# Patient Record
Sex: MALE | Race: WHITE | NOT HISPANIC OR LATINO | Employment: OTHER | URBAN - METROPOLITAN AREA
[De-identification: names, ages, dates, MRNs, and addresses within clinical notes are randomized per-mention and may not be internally consistent; named-entity substitution may affect disease eponyms.]

---

## 2021-05-26 ENCOUNTER — RECORDS - HEALTHEAST (OUTPATIENT)
Dept: ADMINISTRATIVE | Facility: CLINIC | Age: 86
End: 2021-05-26

## 2023-10-21 ENCOUNTER — HEALTH MAINTENANCE LETTER (OUTPATIENT)
Age: 88
End: 2023-10-21

## 2023-11-07 ENCOUNTER — TRANSFERRED RECORDS (OUTPATIENT)
Dept: HEALTH INFORMATION MANAGEMENT | Facility: CLINIC | Age: 88
End: 2023-11-07
Payer: COMMERCIAL

## 2023-11-08 ENCOUNTER — TELEPHONE (OUTPATIENT)
Dept: CARDIOLOGY | Facility: CLINIC | Age: 88
End: 2023-11-08
Payer: COMMERCIAL

## 2023-11-08 NOTE — TELEPHONE ENCOUNTER
[facility] Call [facility]    Phone Message    May a detailed message be left on voicemail: yes     Reason for Call: Other: Pt called in wanting to know if he could schedule for a stress test. Please eloy pt back for further discussion at [phone]     Action Taken: Other: cardiology    Travel Screening: Not Applicable    Thank you!  [facility]

## 2023-11-08 NOTE — TELEPHONE ENCOUNTER
Phone call to patient to discuss stress test. Patient had PCP OV 11/7 with  Dr Toscano, and a stress test was dicussed. Per chart review unable to find order or note. Call place to  [facility] and spoke to Jenn. She located order which was originally place to [facility], and will fax to [facility] location per patient request. Phone call to patient to update, and direct contact provided for further questions. Reviewd upcoming OV with Dr Salazar on 12/6. He verbalized understanding, and no further questions at this time.  -sea

## 2023-11-10 ENCOUNTER — HOSPITAL ENCOUNTER (OUTPATIENT)
Dept: CARDIOLOGY | Facility: HOSPITAL | Age: 88
Discharge: HOME | End: 2023-11-10
Attending: FAMILY MEDICINE
Payer: COMMERCIAL

## 2023-11-10 ENCOUNTER — HOSPITAL ENCOUNTER (OUTPATIENT)
Dept: NUCLEAR MEDICINE | Facility: HOSPITAL | Age: 88
Discharge: HOME | End: 2023-11-10
Attending: FAMILY MEDICINE
Payer: COMMERCIAL

## 2023-11-10 DIAGNOSIS — I25.118 ATHEROSCLEROSIS OF CORONARY ARTERY OF NATIVE HEART WITH OTHER FORM OF ANGINA PECTORIS, UNSPECIFIED VESSEL OR LESION TYPE (H): ICD-10-CM

## 2023-11-10 LAB
CV STRESS CURRENT BP HE: NORMAL
CV STRESS CURRENT HR HE: 75
CV STRESS CURRENT HR HE: 76
CV STRESS CURRENT HR HE: 77
CV STRESS CURRENT HR HE: 77
CV STRESS CURRENT HR HE: 78
CV STRESS CURRENT HR HE: 80
CV STRESS CURRENT HR HE: 83
CV STRESS CURRENT HR HE: 85
CV STRESS CURRENT HR HE: 91
CV STRESS CURRENT HR HE: 92
CV STRESS DEVIATION TIME HE: NORMAL
CV STRESS ECHO PERCENT HR HE: NORMAL
CV STRESS EXERCISE STAGE HE: NORMAL
CV STRESS FINAL RESTING BP HE: NORMAL
CV STRESS FINAL RESTING HR HE: 77
CV STRESS MAX HR HE: 92
CV STRESS MAX TREADMILL GRADE HE: 0
CV STRESS MAX TREADMILL SPEED HE: 0
CV STRESS PEAK DIA BP HE: NORMAL
CV STRESS PEAK SYS BP HE: NORMAL
CV STRESS PHASE HE: NORMAL
CV STRESS PROTOCOL HE: NORMAL
CV STRESS RESTING PT POSITION HE: NORMAL
CV STRESS RESTING PT POSITION HE: NORMAL
CV STRESS ST DEVIATION AMOUNT HE: NORMAL
CV STRESS ST DEVIATION ELEVATION HE: NORMAL
CV STRESS ST EVELATION AMOUNT HE: NORMAL
CV STRESS TEST TYPE HE: NORMAL
CV STRESS TOTAL STAGE TIME MIN 1 HE: NORMAL
RATE PRESSURE PRODUCT: NORMAL
STRESS ECHO BASELINE DIASTOLIC HE: 77
STRESS ECHO BASELINE HR: 77
STRESS ECHO BASELINE SYSTOLIC BP: 174
STRESS ECHO CALCULATED PERCENT HR: 69 %
STRESS ECHO LAST STRESS DIASTOLIC BP: 70
STRESS ECHO LAST STRESS HR: 80
STRESS ECHO LAST STRESS SYSTOLIC BP: 127
STRESS ECHO TARGET HR: 134
STRESS/REST PERFUSION RATIO: 1.26

## 2023-11-10 PROCEDURE — 78452 HT MUSCLE IMAGE SPECT MULT: CPT

## 2023-11-10 PROCEDURE — 93017 CV STRESS TEST TRACING ONLY: CPT

## 2023-11-10 PROCEDURE — 93016 CV STRESS TEST SUPVJ ONLY: CPT | Performed by: INTERNAL MEDICINE

## 2023-11-10 PROCEDURE — A9500 TC99M SESTAMIBI: HCPCS

## 2023-11-10 PROCEDURE — 78452 HT MUSCLE IMAGE SPECT MULT: CPT | Mod: 26 | Performed by: INTERNAL MEDICINE

## 2023-11-10 PROCEDURE — 250N000011 HC RX IP 250 OP 636

## 2023-11-10 PROCEDURE — 343N000001 HC RX 343

## 2023-11-10 PROCEDURE — 93018 CV STRESS TEST I&R ONLY: CPT | Performed by: INTERNAL MEDICINE

## 2023-11-10 RX ORDER — AMINOPHYLLINE 25 MG/ML
50 INJECTION, SOLUTION INTRAVENOUS
Status: DISCONTINUED | OUTPATIENT
Start: 2023-11-10 | End: 2023-11-10 | Stop reason: HOSPADM

## 2023-11-10 RX ORDER — REGADENOSON 0.08 MG/ML
0.4 INJECTION, SOLUTION INTRAVENOUS ONCE
Status: COMPLETED | OUTPATIENT
Start: 2023-11-10 | End: 2023-11-10

## 2023-11-10 RX ADMIN — Medication 29.6 MILLICURIE: at 13:05

## 2023-11-10 RX ADMIN — Medication 8.83 MILLICURIE: at 11:52

## 2023-11-10 RX ADMIN — REGADENOSON 0.4 MG: 0.08 INJECTION, SOLUTION INTRAVENOUS at 13:05

## 2023-11-22 ENCOUNTER — OFFICE VISIT (OUTPATIENT)
Dept: CARDIOLOGY | Facility: CLINIC | Age: 88
End: 2023-11-22
Payer: COMMERCIAL

## 2023-11-22 VITALS
HEART RATE: 79 BPM | WEIGHT: 200 LBS | RESPIRATION RATE: 16 BRPM | HEIGHT: 66 IN | DIASTOLIC BLOOD PRESSURE: 70 MMHG | SYSTOLIC BLOOD PRESSURE: 151 MMHG | BODY MASS INDEX: 32.14 KG/M2

## 2023-11-22 DIAGNOSIS — I10 BENIGN ESSENTIAL HYPERTENSION: ICD-10-CM

## 2023-11-22 DIAGNOSIS — E78.5 DYSLIPIDEMIA, GOAL LDL BELOW 70: ICD-10-CM

## 2023-11-22 DIAGNOSIS — N18.4 CHRONIC KIDNEY DISEASE, STAGE IV (SEVERE) (H): ICD-10-CM

## 2023-11-22 DIAGNOSIS — I25.119 CORONARY ARTERY DISEASE INVOLVING NATIVE CORONARY ARTERY OF NATIVE HEART WITH ANGINA PECTORIS (H): Primary | ICD-10-CM

## 2023-11-22 DIAGNOSIS — E11.9 TYPE 2 DIABETES MELLITUS WITHOUT COMPLICATION, WITHOUT LONG-TERM CURRENT USE OF INSULIN (H): ICD-10-CM

## 2023-11-22 PROCEDURE — 99205 OFFICE O/P NEW HI 60 MIN: CPT | Performed by: INTERNAL MEDICINE

## 2023-11-22 RX ORDER — ASPIRIN 81 MG/1
81 TABLET ORAL DAILY
COMMUNITY

## 2023-11-22 RX ORDER — METOPROLOL SUCCINATE 50 MG/1
50 TABLET, EXTENDED RELEASE ORAL DAILY
COMMUNITY
Start: 2023-08-01

## 2023-11-22 RX ORDER — ALLOPURINOL 100 MG/1
100 TABLET ORAL DAILY
COMMUNITY
Start: 2023-08-01

## 2023-11-22 RX ORDER — FLUOCINONIDE 0.5 MG/G
CREAM TOPICAL PRN
COMMUNITY
Start: 2023-08-01

## 2023-11-22 RX ORDER — SIMVASTATIN 40 MG
40 TABLET ORAL AT BEDTIME
COMMUNITY
Start: 2023-07-17

## 2023-11-22 RX ORDER — ISOSORBIDE MONONITRATE 30 MG/1
30 TABLET, EXTENDED RELEASE ORAL DAILY
Qty: 30 TABLET | Refills: 11 | Status: SHIPPED | OUTPATIENT
Start: 2023-11-22 | End: 2024-01-09

## 2023-11-22 RX ORDER — GLIPIZIDE 5 MG/1
2 TABLET, FILM COATED, EXTENDED RELEASE ORAL DAILY
COMMUNITY
Start: 2023-08-01

## 2023-11-22 RX ORDER — PREDNISONE 5 MG/1
5 TABLET ORAL DAILY
COMMUNITY
Start: 2023-08-01

## 2023-11-22 RX ORDER — NITROGLYCERIN 0.4 MG/1
TABLET SUBLINGUAL
Qty: 30 TABLET | Refills: 1 | Status: SHIPPED | OUTPATIENT
Start: 2023-11-22

## 2023-11-22 RX ORDER — FLUTICASONE PROPIONATE 50 MCG
2 SPRAY, SUSPENSION (ML) NASAL AT BEDTIME
COMMUNITY
Start: 2023-08-01

## 2023-11-22 RX ORDER — ISOSORBIDE MONONITRATE 30 MG/1
30 TABLET, EXTENDED RELEASE ORAL DAILY
Qty: 30 TABLET | Refills: 11 | Status: SHIPPED | OUTPATIENT
Start: 2023-11-22 | End: 2023-11-22

## 2023-11-22 RX ORDER — LISINOPRIL/HYDROCHLOROTHIAZIDE 10-12.5 MG
1 TABLET ORAL 2 TIMES DAILY
COMMUNITY
Start: 2023-05-31

## 2023-11-22 NOTE — PROGRESS NOTES
Click to link to [facility] HEART CARE NOTE    Thank you,  , for asking me to see Lino Llanos in consultation at [facility] to evaluate chest pain and abnormal nuclear stress test.      Assessment/Plan:   Coronary artery disease with stable angina: The patient developed exertional chest pain, associated with shortness of breath and fatigue.  His symptoms are typically triggered by exertion, relieved by rest, similar pain to that prior to stent placement.  His nuclear stress test there was reported a small size of inducible myocardial ischemia in distal anterior segment, LVEF was 70%.  We discussed the further evaluation and management.  Since the patient has stage IV CKD, small size of inducible myocardial ischemia, stable angina, treat him medically at this time.  Continue aspirin, metoprolol succinate 50 mg daily, simvastatin.  Add isosorbide mononitrate 30 mg daily, titrate the dosage for relief of angina.  If his symptoms become worse, he will call me back.  If he failed medical treatment, and then consider coronary angiogram with possible PCI.  The patient and his son Felipe agreed with the plan.  Benign essential hypertension: His blood pressure is controlled with metoprolol XL 50 mg daily, lisinopril 25 mg daily, hydrochlorothiazide.  Dyslipidemia: Continue simvastatin 40 mg at bedtime.  LDL was 62.  Stage IV CKD, type 2 diabetes: Follow-up with primary care physician.  No change in treatment today.    Thank you for the opportunity to be involved in the care of Lino Llanos. If you have any questions, please feel free to contact me.  I will see the patient again in 6 months and as needed    Much or all of the text in this note was generated through the use of Dragon Dictate voice-to-text software. Errors in spelling or words which seem out of context are unintentional.   Sound alike errors, in particular, may have escaped editing.       History of  Present Illness:   It is my pleasure to see Lino Llanos at the [facility] for evaluation of chest pain and abnormal nuclear stress test. Lino Llanos is a 88 year old male with a medical history of coronary artery disease status post stent placement 20 years ago, benign essential hypertension, dyslipidemia, stage IV CKD and coronary artery disease.    The patient presents to cardiology clinic for evaluation of chest pain and abnormal nuclear stress test.  He states that he developed intermittent exertional chest pain, shortness of breath over last several months.  He described his chest pain as located upper anterior chest, pressure pain, mild in severity, typically associated with exertion, relieved by rest in few minutes.  No radiation.  He also complains some fatigue.  He has no leg edema.  He states that his blood pressure always a little bit high at physician's office.  At home, his blood pressure has been controlled 130/70 mmHg.    Past Medical History:     Patient Active Problem List   Diagnosis    Coronary artery disease involving native coronary artery of native heart with angina pectoris (H24)    Benign essential hypertension    Dyslipidemia, goal LDL below 70    Type 2 diabetes mellitus without complication, without long-term current use of insulin (H)    Chronic kidney disease, stage IV (severe) (H)       Past Surgical History:   No past surgical history on file.    Family History:   Reviewed: Both parents had CAD.  Mother had CABG at age 83.    Social History:    reports that he has quit smoking. His smoking use included cigarettes and pipe. He has never used smokeless tobacco. He reports that he does not use drugs.    Review of Systems:   12 systems are reviewed negative except for in HPI.    Meds:     Current Outpatient Medications:     allopurinol (ZYLOPRIM) 100 MG tablet, Take 100 mg by mouth daily, Disp: , Rfl:     aspirin 81 MG EC tablet, Take 81 mg by mouth daily,  "Disp: , Rfl:     fluocinonide (LIDEX) 0.05 % external cream, Apply topically as needed (Rash, Irritation), Disp: , Rfl:     fluticasone (FLONASE) 50 MCG/ACT nasal spray, Spray 2 sprays into both nostrils at bedtime, Disp: , Rfl:     glipiZIDE (GLUCOTROL XL) 5 MG 24 hr tablet, Take 2 tablets by mouth daily, Disp: , Rfl:     isosorbide mononitrate (IMDUR) 30 MG 24 hr tablet, Take 1 tablet (30 mg) by mouth daily, Disp: 30 tablet, Rfl: 11    linagliptin (TRADJENTA) 5 MG TABS tablet, Take 5 mg by mouth daily, Disp: , Rfl:     lisinopril-hydrochlorothiazide (ZESTORETIC) 10-12.5 MG tablet, Take 1 tablet by mouth 2 times daily, Disp: , Rfl:     metoprolol succinate ER (TOPROL XL) 50 MG 24 hr tablet, Take 50 mg by mouth daily, Disp: , Rfl:     nitroGLYcerin (NITROSTAT) 0.4 MG sublingual tablet, For chest pain place 1 tablet under the tongue every 5 minutes for 3 doses. If symptoms persist 5 minutes after 1st dose call 911., Disp: 30 tablet, Rfl: 1    predniSONE (DELTASONE) 5 MG tablet, Take 5 mg by mouth daily, Disp: , Rfl:     simvastatin (ZOCOR) 40 MG tablet, Take 40 mg by mouth at bedtime, Disp: , Rfl:      Allergies:   Patient has no known allergies.    Objective:      Physical Exam  90.7 kg (200 lb)  1.676 m (5' 6\")  Body mass index is 32.28 kg/m .  BP (!) 151/70 (BP Location: Right arm, Patient Position: Sitting, Cuff Size: Adult Large)   Pulse 79   Resp 16   Ht 1.676 m (5' 6\")   Wt 90.7 kg (200 lb)   BMI 32.28 kg/m      General Appearance:   Awake, Alert, No acute distress.   HEENT:  Pupil equal, reactive to light. No scleral icterus; the mucous membranes were moist. No oral ulcers or thrush.    Neck: No cervical bruits. No JVD. No thyromegaly. No lymph node enlargement or tenderness.   Chest: The spine was straight. The chest was symmetric.   Lungs:   Respirations unlabored. Lungs are clear to auscultation. No crackles. No wheezing.   Cardiovascular:   RRR, normal first and second heart sounds with no murmurs. " "No rubs or gallops.    Abdomen:  Obese. Soft. No tenderness. Non-distended. Bowels sounds are present   Extremities: Equal posterior tibial pulses. No leg edema.   Skin: No rashes or ulcers. Warm, Dry.   Musculoskeletal: No tenderness. No deformity.   Neurologic: Mood and affect are appropriate. No focal deficits.         EKG:  Personally reivewed  Sinus tachycardia  No ischemic ST-T change    Cardiac Imaging Studies  Nuclear stress test on November 10, 2023:    1.The nuclear stress test is abnormal.    2.Negative pharmacological regadenoson ECG for ischemia.    3.There is a small area of mild ischemia in the distal anterior segment(s) of the left ventricle.  No scar seen.    4.The left ventricular ejection fraction at stress is greater than 70%.    5.Stress to rest cavity ratio is 1.26.    6.The patient is at a low risk of future cardiac ischemic events.    There is no prior study for comparison.    Lab Review   Lab Results   Component Value Date     01/02/2020    CO2 23 01/02/2020    BUN 27 01/02/2020     Lab Results   Component Value Date    WBC 16.5 01/02/2020    HGB 13.1 01/02/2020    HCT 38.2 01/02/2020     01/02/2020     01/02/2020     No results found for: \"CHOL\", \"TRIG\", \"HDL\", \"LDL\", \"LDLDIRECT\"  No results found for: \"LDL\"  No results found for: \"TROPONINI\"  No results found for: \"BNP\"  No results found for: \"TSH\"            "

## 2023-12-30 ENCOUNTER — HEALTH MAINTENANCE LETTER (OUTPATIENT)
Age: 88
End: 2023-12-30

## 2024-01-05 ENCOUNTER — TELEPHONE (OUTPATIENT)
Dept: CARDIOLOGY | Facility: CLINIC | Age: 89
End: 2024-01-05
Payer: COMMERCIAL

## 2024-01-05 DIAGNOSIS — I25.119 CORONARY ARTERY DISEASE INVOLVING NATIVE CORONARY ARTERY OF NATIVE HEART WITH ANGINA PECTORIS (H): ICD-10-CM

## 2024-01-05 NOTE — TELEPHONE ENCOUNTER
[facility] Call Center    Phone Message    May a detailed message be left on voicemail: yes     Reason for Call: Medication Refill Request    Has the patient contacted the pharmacy for the refill? Yes   Name of medication being requested: isosorbide mononitrate (IMDUR) 30 MG 24 hr tablet   Provider who prescribed the medication: Antoinette Salazar MD   Pharmacy: [address]  P: [phone]  F: [phone]  Date medication is needed: asap   NEW PHARMACY  Action Taken: Other: cardio    Travel Screening: Not Applicable    Thank you!  [facility]

## 2024-01-09 RX ORDER — ISOSORBIDE MONONITRATE 30 MG/1
30 TABLET, EXTENDED RELEASE ORAL DAILY
Qty: 90 TABLET | Refills: 3 | Status: SHIPPED | OUTPATIENT
Start: 2024-01-09

## 2024-01-29 ENCOUNTER — OFFICE VISIT (OUTPATIENT)
Dept: CARDIOLOGY | Facility: CLINIC | Age: 89
End: 2024-01-29
Attending: INTERNAL MEDICINE
Payer: COMMERCIAL

## 2024-01-29 VITALS
BODY MASS INDEX: 31.73 KG/M2 | HEART RATE: 60 BPM | SYSTOLIC BLOOD PRESSURE: 110 MMHG | WEIGHT: 196.6 LBS | DIASTOLIC BLOOD PRESSURE: 58 MMHG | RESPIRATION RATE: 20 BRPM

## 2024-01-29 DIAGNOSIS — E11.9 TYPE 2 DIABETES MELLITUS WITHOUT COMPLICATION, WITHOUT LONG-TERM CURRENT USE OF INSULIN (H): ICD-10-CM

## 2024-01-29 DIAGNOSIS — I10 BENIGN ESSENTIAL HYPERTENSION: ICD-10-CM

## 2024-01-29 DIAGNOSIS — E78.5 DYSLIPIDEMIA, GOAL LDL BELOW 70: ICD-10-CM

## 2024-01-29 DIAGNOSIS — I25.119 CORONARY ARTERY DISEASE INVOLVING NATIVE CORONARY ARTERY OF NATIVE HEART WITH ANGINA PECTORIS (H): Primary | ICD-10-CM

## 2024-01-29 PROCEDURE — 99214 OFFICE O/P EST MOD 30 MIN: CPT | Performed by: INTERNAL MEDICINE

## 2024-01-29 NOTE — LETTER
1/29/2024    Tomer Toscano MD  [address]  [facility] [address]    RE: Lino Llanos       Dear Colleague,     I had the pleasure of seeing Lino Llanos in the [facility].      Click to link to [facility] HEART CARE NOTE       Assessment/Plan:   Coronary artery disease with stable angina: The patient's exertion no chest pain has been well-controlled with isosorbide mononitrate 30 mg daily, metoprolol succinate 50 mg daily.  Continue current medical treatment.  No coronary angiogram with with PCI at this time.  Benign essential hypertension: His blood pressure is controlled with metoprolol XL 50 mg daily, lisinopril-hydrochlorothiazide 10-25 mg daily.  Dyslipidemia: Continue simvastatin 40 mg at bedtime.  LDL was 62.  Stage IV CKD, type 2 diabetes: Follow-up with primary care physician.  No change in treatment today.    Thank you for the opportunity to be involved in the care of Lino Llanos. If you have any questions, please feel free to contact me.  I will see the patient again in 6 months and as needed    Much or all of the text in this note was generated through the use of Dragon Dictate voice-to-text software. Errors in spelling or words which seem out of context are unintentional.   Sound alike errors, in particular, may have escaped editing.       History of Present Illness:   It is my pleasure to see Lino Llanos at the [facility] for routine cardiology follow-up. Lino Llanos is a 88 year old male with a medical history of coronary artery disease status post stent placement 20 years ago, benign essential hypertension, dyslipidemia, stage IV CKD and coronary artery disease.    The patient states that his chest pain has been well-controlled with isosorbide mononitrate 30 mg daily after isosorbide mononitrate was started at last visit.  He denies shortness of breath on exertion, palpitations, dizziness,  orthopnea, PND.  He has trace leg edema which has been stable.  His blood pressure and heart rate are controlled.  LDL was controlled.      Past Medical History:     Patient Active Problem List   Diagnosis    Coronary artery disease involving native coronary artery of native heart with angina pectoris (H24)    Benign essential hypertension    Dyslipidemia, goal LDL below 70    Type 2 diabetes mellitus without complication, without long-term current use of insulin (H)    Chronic kidney disease, stage IV (severe) (H)       Past Surgical History:   No past surgical history on file.    Family History:   Reviewed: Both parents had CAD.  Mother had CABG at age 83.    Social History:    reports that he has quit smoking. His smoking use included cigarettes and pipe. He has never used smokeless tobacco. He reports that he does not use drugs.    Review of Systems:   12 systems are reviewed negative except for in HPI.    Meds:     Current Outpatient Medications:     allopurinol (ZYLOPRIM) 100 MG tablet, Take 100 mg by mouth daily, Disp: , Rfl:     aspirin 81 MG EC tablet, Take 81 mg by mouth daily, Disp: , Rfl:     fluocinonide (LIDEX) 0.05 % external cream, Apply topically as needed (Rash, Irritation), Disp: , Rfl:     fluticasone (FLONASE) 50 MCG/ACT nasal spray, Spray 2 sprays into both nostrils at bedtime, Disp: , Rfl:     glipiZIDE (GLUCOTROL XL) 5 MG 24 hr tablet, Take 2 tablets by mouth daily, Disp: , Rfl:     isosorbide mononitrate (IMDUR) 30 MG 24 hr tablet, Take 1 tablet (30 mg) by mouth daily, Disp: 90 tablet, Rfl: 3    linagliptin (TRADJENTA) 5 MG TABS tablet, Take 5 mg by mouth daily, Disp: , Rfl:     lisinopril-hydrochlorothiazide (ZESTORETIC) 10-12.5 MG tablet, Take 1 tablet by mouth 2 times daily, Disp: , Rfl:     metoprolol succinate ER (TOPROL XL) 50 MG 24 hr tablet, Take 50 mg by mouth daily, Disp: , Rfl:     nitroGLYcerin (NITROSTAT) 0.4 MG sublingual tablet, For chest pain place 1 tablet under the tongue  every 5 minutes for 3 doses. If symptoms persist 5 minutes after 1st dose call 911., Disp: 30 tablet, Rfl: 1    predniSONE (DELTASONE) 5 MG tablet, Take 5 mg by mouth daily, Disp: , Rfl:     simvastatin (ZOCOR) 40 MG tablet, Take 40 mg by mouth at bedtime, Disp: , Rfl:      Allergies:   Patient has no known allergies.    Objective:      Physical Exam  89.2 kg (196 lb 9.6 oz)     Body mass index is 31.73 kg/m .  /58 (BP Location: Right arm, Patient Position: Sitting, Cuff Size: Adult Large)   Pulse 60   Resp 20   Wt 89.2 kg (196 lb 9.6 oz)   BMI 31.73 kg/m      General Appearance:   Awake, Alert, No acute distress.   HEENT:  Pupil equal, reactive to light. No scleral icterus; the mucous membranes were moist. No oral ulcers or thrush.    Neck: No cervical bruits. No JVD. No thyromegaly. No lymph node enlargement or tenderness.   Chest: The spine was straight. The chest was symmetric.   Lungs:   Respirations unlabored. Lungs are clear to auscultation. No crackles. No wheezing.   Cardiovascular:   RRR, normal first and second heart sounds with no murmurs. No rubs or gallops.    Abdomen:  Obese. Soft. No tenderness. Non-distended. Bowels sounds are present   Extremities: Equal posterior tibial pulses. Trace leg edema.   Skin: No rashes or ulcers. Warm, Dry.   Musculoskeletal: No tenderness. No deformity.   Neurologic: Mood and affect are appropriate. No focal deficits.         EKG:  Personally reivewed  Sinus tachycardia  No ischemic ST-T change    Cardiac Imaging Studies  Nuclear stress test on November 10, 2023:    1.The nuclear stress test is abnormal.    2.Negative pharmacological regadenoson ECG for ischemia.    3.There is a small area of mild ischemia in the distal anterior segment(s) of the left ventricle.  No scar seen.    4.The left ventricular ejection fraction at stress is greater than 70%.    5.Stress to rest cavity ratio is 1.26.    6.The patient is at a low risk of future cardiac ischemic  events.    There is no prior study for comparison.    Lab Review   Lab Results   Component Value Date     01/02/2020    CO2 23 01/02/2020    BUN 27 01/02/2020     Lab Results   Component Value Date    WBC 16.5 01/02/2020    HGB 13.1 01/02/2020    HCT 38.2 01/02/2020     01/02/2020     01/02/2020                 Thank you for allowing me to participate in the care of your patient.      Sincerely,     Antoinette Salazar MD     [facility]  cc:   Antoinette Salazar MD  [address] [facility] [address]  [place] [address]

## 2024-01-29 NOTE — PROGRESS NOTES
Click to link to [facility] HEART CARE NOTE       Assessment/Plan:   Coronary artery disease with stable angina: The patient's exertion no chest pain has been well-controlled with isosorbide mononitrate 30 mg daily, metoprolol succinate 50 mg daily.  Continue current medical treatment.  No coronary angiogram with with PCI at this time.  Benign essential hypertension: His blood pressure is controlled with metoprolol XL 50 mg daily, lisinopril-hydrochlorothiazide 10-25 mg daily.  Dyslipidemia: Continue simvastatin 40 mg at bedtime.  LDL was 62.  Stage IV CKD, type 2 diabetes: Follow-up with primary care physician.  No change in treatment today.    Thank you for the opportunity to be involved in the care of Lino Llanos. If you have any questions, please feel free to contact me.  I will see the patient again in 6 months and as needed    Much or all of the text in this note was generated through the use of Dragon Dictate voice-to-text software. Errors in spelling or words which seem out of context are unintentional.   Sound alike errors, in particular, may have escaped editing.       History of Present Illness:   It is my pleasure to see Lino Llanos at the [facility] clinic for routine cardiology follow-up. Lino Llanos is a 88 year old male with a medical history of coronary artery disease status post stent placement 20 years ago, benign essential hypertension, dyslipidemia, stage IV CKD and coronary artery disease.    The patient states that his chest pain has been well-controlled with isosorbide mononitrate 30 mg daily after isosorbide mononitrate was started at last visit.  He denies shortness of breath on exertion, palpitations, dizziness, orthopnea, PND.  He has trace leg edema which has been stable.  His blood pressure and heart rate are controlled.  LDL was controlled.      Past Medical History:     Patient Active Problem List   Diagnosis    Coronary  artery disease involving native coronary artery of native heart with angina pectoris (H24)    Benign essential hypertension    Dyslipidemia, goal LDL below 70    Type 2 diabetes mellitus without complication, without long-term current use of insulin (H)    Chronic kidney disease, stage IV (severe) (H)       Past Surgical History:   No past surgical history on file.    Family History:   Reviewed: Both parents had CAD.  Mother had CABG at age 83.    Social History:    reports that he has quit smoking. His smoking use included cigarettes and pipe. He has never used smokeless tobacco. He reports that he does not use drugs.    Review of Systems:   12 systems are reviewed negative except for in HPI.    Meds:     Current Outpatient Medications:     allopurinol (ZYLOPRIM) 100 MG tablet, Take 100 mg by mouth daily, Disp: , Rfl:     aspirin 81 MG EC tablet, Take 81 mg by mouth daily, Disp: , Rfl:     fluocinonide (LIDEX) 0.05 % external cream, Apply topically as needed (Rash, Irritation), Disp: , Rfl:     fluticasone (FLONASE) 50 MCG/ACT nasal spray, Spray 2 sprays into both nostrils at bedtime, Disp: , Rfl:     glipiZIDE (GLUCOTROL XL) 5 MG 24 hr tablet, Take 2 tablets by mouth daily, Disp: , Rfl:     isosorbide mononitrate (IMDUR) 30 MG 24 hr tablet, Take 1 tablet (30 mg) by mouth daily, Disp: 90 tablet, Rfl: 3    linagliptin (TRADJENTA) 5 MG TABS tablet, Take 5 mg by mouth daily, Disp: , Rfl:     lisinopril-hydrochlorothiazide (ZESTORETIC) 10-12.5 MG tablet, Take 1 tablet by mouth 2 times daily, Disp: , Rfl:     metoprolol succinate ER (TOPROL XL) 50 MG 24 hr tablet, Take 50 mg by mouth daily, Disp: , Rfl:     nitroGLYcerin (NITROSTAT) 0.4 MG sublingual tablet, For chest pain place 1 tablet under the tongue every 5 minutes for 3 doses. If symptoms persist 5 minutes after 1st dose call 911., Disp: 30 tablet, Rfl: 1    predniSONE (DELTASONE) 5 MG tablet, Take 5 mg by mouth daily, Disp: , Rfl:     simvastatin (ZOCOR) 40 MG  tablet, Take 40 mg by mouth at bedtime, Disp: , Rfl:      Allergies:   Patient has no known allergies.    Objective:      Physical Exam  89.2 kg (196 lb 9.6 oz)     Body mass index is 31.73 kg/m .  /58 (BP Location: Right arm, Patient Position: Sitting, Cuff Size: Adult Large)   Pulse 60   Resp 20   Wt 89.2 kg (196 lb 9.6 oz)   BMI 31.73 kg/m      General Appearance:   Awake, Alert, No acute distress.   HEENT:  Pupil equal, reactive to light. No scleral icterus; the mucous membranes were moist. No oral ulcers or thrush.    Neck: No cervical bruits. No JVD. No thyromegaly. No lymph node enlargement or tenderness.   Chest: The spine was straight. The chest was symmetric.   Lungs:   Respirations unlabored. Lungs are clear to auscultation. No crackles. No wheezing.   Cardiovascular:   RRR, normal first and second heart sounds with no murmurs. No rubs or gallops.    Abdomen:  Obese. Soft. No tenderness. Non-distended. Bowels sounds are present   Extremities: Equal posterior tibial pulses. Trace leg edema.   Skin: No rashes or ulcers. Warm, Dry.   Musculoskeletal: No tenderness. No deformity.   Neurologic: Mood and affect are appropriate. No focal deficits.         EKG:  Personally reivewed  Sinus tachycardia  No ischemic ST-T change    Cardiac Imaging Studies  Nuclear stress test on November 10, 2023:    1.The nuclear stress test is abnormal.    2.Negative pharmacological regadenoson ECG for ischemia.    3.There is a small area of mild ischemia in the distal anterior segment(s) of the left ventricle.  No scar seen.    4.The left ventricular ejection fraction at stress is greater than 70%.    5.Stress to rest cavity ratio is 1.26.    6.The patient is at a low risk of future cardiac ischemic events.    There is no prior study for comparison.    Lab Review   Lab Results   Component Value Date     01/02/2020    CO2 23 01/02/2020    BUN 27 01/02/2020     Lab Results   Component Value Date    WBC 16.5  01/02/2020    HGB 13.1 01/02/2020    HCT 38.2 01/02/2020     01/02/2020     01/02/2020

## 2024-05-18 ENCOUNTER — HEALTH MAINTENANCE LETTER (OUTPATIENT)
Age: 89
End: 2024-05-18

## 2024-09-29 ENCOUNTER — HEALTH MAINTENANCE LETTER (OUTPATIENT)
Age: 89
End: 2024-09-29

## 2024-10-30 ENCOUNTER — HOSPITAL ENCOUNTER (EMERGENCY)
Facility: HOSPITAL | Age: 89
Discharge: HOME | End: 2024-10-30
Attending: EMERGENCY MEDICINE | Admitting: EMERGENCY MEDICINE
Payer: COMMERCIAL

## 2024-10-30 ENCOUNTER — APPOINTMENT (OUTPATIENT)
Dept: CT IMAGING | Facility: HOSPITAL | Age: 89
End: 2024-10-30
Attending: EMERGENCY MEDICINE
Payer: COMMERCIAL

## 2024-10-30 VITALS
RESPIRATION RATE: 14 BRPM | BODY MASS INDEX: 27.68 KG/M2 | TEMPERATURE: 97 F | HEIGHT: 66 IN | OXYGEN SATURATION: 99 % | WEIGHT: 172.2 LBS | SYSTOLIC BLOOD PRESSURE: 168 MMHG | HEART RATE: 80 BPM | DIASTOLIC BLOOD PRESSURE: 79 MMHG

## 2024-10-30 DIAGNOSIS — N13.30 HYDROURETERONEPHROSIS: ICD-10-CM

## 2024-10-30 DIAGNOSIS — K57.32 SIGMOID DIVERTICULITIS: ICD-10-CM

## 2024-10-30 LAB
ALBUMIN SERPL BCG-MCNC: 4.3 G/DL (ref 3.5–5.2)
ALBUMIN UR-MCNC: 20 MG/DL
ALP SERPL-CCNC: 66 U/L (ref 40–150)
ALT SERPL W P-5'-P-CCNC: 26 U/L (ref 0–70)
ANION GAP SERPL CALCULATED.3IONS-SCNC: 13 MMOL/L (ref 7–15)
APPEARANCE UR: CLEAR
AST SERPL W P-5'-P-CCNC: 18 U/L (ref 0–45)
BACTERIA #/AREA URNS HPF: ABNORMAL /HPF
BASOPHILS # BLD AUTO: 0 10E3/UL (ref 0–0.2)
BASOPHILS NFR BLD AUTO: 0 %
BILIRUB SERPL-MCNC: 0.5 MG/DL
BILIRUB UR QL STRIP: NEGATIVE
BUN SERPL-MCNC: 34.5 MG/DL (ref 8–23)
CALCIUM SERPL-MCNC: 10.5 MG/DL (ref 8.8–10.4)
CHLORIDE SERPL-SCNC: 100 MMOL/L (ref 98–107)
COLOR UR AUTO: ABNORMAL
CREAT SERPL-MCNC: 2.16 MG/DL (ref 0.67–1.17)
EGFRCR SERPLBLD CKD-EPI 2021: 29 ML/MIN/1.73M2
EOSINOPHIL # BLD AUTO: 0 10E3/UL (ref 0–0.7)
EOSINOPHIL NFR BLD AUTO: 0 %
ERYTHROCYTE [DISTWIDTH] IN BLOOD BY AUTOMATED COUNT: 14.4 % (ref 10–15)
GLUCOSE SERPL-MCNC: 308 MG/DL (ref 70–99)
GLUCOSE UR STRIP-MCNC: 1000 MG/DL
HCO3 SERPL-SCNC: 26 MMOL/L (ref 22–29)
HCT VFR BLD AUTO: 32.4 % (ref 40–53)
HGB BLD-MCNC: 10.9 G/DL (ref 13.3–17.7)
HGB UR QL STRIP: NEGATIVE
HOLD SPECIMEN: NORMAL
HOLD SPECIMEN: NORMAL
IMM GRANULOCYTES # BLD: 0.1 10E3/UL
IMM GRANULOCYTES NFR BLD: 1 %
KETONES UR STRIP-MCNC: NEGATIVE MG/DL
LEUKOCYTE ESTERASE UR QL STRIP: NEGATIVE
LIPASE SERPL-CCNC: 41 U/L (ref 13–60)
LYMPHOCYTES # BLD AUTO: 1 10E3/UL (ref 0.8–5.3)
LYMPHOCYTES NFR BLD AUTO: 9 %
MCH RBC QN AUTO: 35.6 PG (ref 26.5–33)
MCHC RBC AUTO-ENTMCNC: 33.6 G/DL (ref 31.5–36.5)
MCV RBC AUTO: 106 FL (ref 78–100)
MONOCYTES # BLD AUTO: 0.5 10E3/UL (ref 0–1.3)
MONOCYTES NFR BLD AUTO: 4 %
NEUTROPHILS # BLD AUTO: 9.4 10E3/UL (ref 1.6–8.3)
NEUTROPHILS NFR BLD AUTO: 85 %
NITRATE UR QL: NEGATIVE
NRBC # BLD AUTO: 0 10E3/UL
NRBC BLD AUTO-RTO: 0 /100
PH UR STRIP: 6 [PH] (ref 5–7)
PLATELET # BLD AUTO: 136 10E3/UL (ref 150–450)
POTASSIUM SERPL-SCNC: 4.8 MMOL/L (ref 3.4–5.3)
PROT SERPL-MCNC: 7 G/DL (ref 6.4–8.3)
RBC # BLD AUTO: 3.06 10E6/UL (ref 4.4–5.9)
RBC URINE: <1 /HPF
SODIUM SERPL-SCNC: 139 MMOL/L (ref 135–145)
SP GR UR STRIP: 1.01 (ref 1–1.03)
UROBILINOGEN UR STRIP-MCNC: <2 MG/DL
WBC # BLD AUTO: 11 10E3/UL (ref 4–11)
WBC URINE: 0 /HPF

## 2024-10-30 PROCEDURE — 74176 CT ABD & PELVIS W/O CONTRAST: CPT

## 2024-10-30 PROCEDURE — 83690 ASSAY OF LIPASE: CPT | Performed by: EMERGENCY MEDICINE

## 2024-10-30 PROCEDURE — 85004 AUTOMATED DIFF WBC COUNT: CPT | Performed by: EMERGENCY MEDICINE

## 2024-10-30 PROCEDURE — 36415 COLL VENOUS BLD VENIPUNCTURE: CPT | Performed by: EMERGENCY MEDICINE

## 2024-10-30 PROCEDURE — 99284 EMERGENCY DEPT VISIT MOD MDM: CPT | Mod: 25

## 2024-10-30 PROCEDURE — 82310 ASSAY OF CALCIUM: CPT | Performed by: EMERGENCY MEDICINE

## 2024-10-30 PROCEDURE — 81001 URINALYSIS AUTO W/SCOPE: CPT | Performed by: EMERGENCY MEDICINE

## 2024-10-30 RX ORDER — AMOXICILLIN AND CLAVULANATE POTASSIUM 500; 125 MG/1; MG/1
1 TABLET, FILM COATED ORAL 2 TIMES DAILY
Qty: 14 TABLET | Refills: 0 | Status: SHIPPED | OUTPATIENT
Start: 2024-10-30 | End: 2024-11-06

## 2024-10-30 ASSESSMENT — ACTIVITIES OF DAILY LIVING (ADL): ADLS_ACUITY_SCORE: 0

## 2024-10-30 ASSESSMENT — COLUMBIA-SUICIDE SEVERITY RATING SCALE - C-SSRS
1. IN THE PAST MONTH, HAVE YOU WISHED YOU WERE DEAD OR WISHED YOU COULD GO TO SLEEP AND NOT WAKE UP?: NO
2. HAVE YOU ACTUALLY HAD ANY THOUGHTS OF KILLING YOURSELF IN THE PAST MONTH?: NO
6. HAVE YOU EVER DONE ANYTHING, STARTED TO DO ANYTHING, OR PREPARED TO DO ANYTHING TO END YOUR LIFE?: NO

## 2024-10-30 NOTE — ED TRIAGE NOTES
Patient presents to ED for evaluation of abdominal pain approximately 10 days. He was seen at [facility] urgent care today, his xray was unremarkable, and he was referred to ED for further workup. Patient endorses generalized abdominal pain with increased tenderness upon palpation to the LLQ. Also endorses flatus, abdominal fullness, and some diarrhea.      Triage Assessment (Adult)       Row Name 10/30/24 8651          Triage Assessment    Airway WDL WDL        Respiratory WDL    Respiratory WDL WDL        Cardiac WDL    Cardiac WDL WDL        Cognitive/Neuro/Behavioral WDL    Cognitive/Neuro/Behavioral WDL WDL

## 2024-10-30 NOTE — ED PROVIDER NOTES
EMERGENCY DEPARTMENT ENCOUNTER      NAME: Lino Llanos  AGE: 89 year old male  YOB: 1935  MRN: [id number]  EVALUATION DATE & TIME: No admission date for patient encounter.    PCP: Tomer Toscano    ED PROVIDER: Rosie Zavala MD    Chief Complaint   Patient presents with    Abdominal Pain         FINAL IMPRESSION:  1. Sigmoid diverticulitis    2. Hydroureteronephrosis          ED COURSE & MEDICAL DECISION MAKING:    Pertinent Labs & Imaging studies reviewed. (See chart for details)  89 year old male with history of CAD, HTN, HLD, CKD and DM who presents to the Emergency Department for evaluation of 10 days of mild lower abdominal discomfort with slight increase of diarrhea.  Mild lower abdominal discomfort greatest in the left lower quadrant on exam.  With history of diverticulosis and previous diverticulitis highly suspicious for recurrent diverticulitis.  Differential includes UTI, pyelonephritis, bowel obstruction, ileus.    Patient initially seen evaluate myself in triage area due to boarding crisis.  CBC, CMP, lipase obtained and notable for baseline anemia and renal dysfunction.  CT of the abdomen pelvis shows evidence of sigmoid diverticulitis.  Will discharge to home with Augmentin.  Incidentally he has right-sided hydroureteronephrosis.  Was given outpatient urology referral to evaluate for same though thankfully renal dysfunction is at baseline.      ED Course as of 10/30/24 2209   Wed Oct 30, 2024   1414 I met with the patient, obtained history, performed an initial exam, and discussed options and plan for diagnostics and treatment here in the ED.    1453 Hemoglobin(!): 10.9  Down from 13.1, 3yrs ago. However per [facility] was 9.3 last year   1506 Creatinine(!): 2.16  Similar to 4yrs ago   1545 CT Abdomen Pelvis w/o Contrast  CT independently interpreted by myself with evidence of diverticulitis within the sigmoid colon   1601 Spoke with ED pharmacist regarding renally dosing  antibiotics       Medical Decision Making  Obtained supplemental history:Supplemental history obtained?: Family Member/Significant Other  Reviewed external records: External records reviewed?: Outpatient Record: Urgent Care 10/30/2024  Care impacted by chronic illness:Chronic Kidney Disease, Diabetes, Heart Disease, Hyperlipidemia, and Hypertension  Did you consider but not order tests?: Work up considered but not performed and documented in chart, if applicable  Did you interpret images independently?: Independent interpretation of ECG and images noted in documentation, when applicable.  Consultation discussion with other provider:Did you involve another provider (consultant, , pharmacy, etc.)?: I discussed the care with another health care provider, see documentation for details.  Discharge. I prescribed additional prescription strength medication(s) as charted. See documentation for any additional details.    MIPS: Not Applicable      At the conclusion of the encounter I discussed the results of all of the tests and the disposition. The questions were answered. The patient or family acknowledged understanding and was agreeable with the care plan.      MEDICATIONS GIVEN IN THE EMERGENCY:  Medications - No data to display    NEW PRESCRIPTIONS STARTED AT TODAY'S ER VISIT  Discharge Medication List as of 10/30/2024  4:07 PM        START taking these medications    Details   amoxicillin-clavulanate (AUGMENTIN) 500-125 MG tablet Take 1 tablet by mouth 2 times daily for 7 days., Disp-14 tablet, R-0, Local Print                =================================================================    HPI    Patient information was obtained from: patient     Use of Intrepreter: N/A      Lino Llanos is a 89 year old male with pertinent medical history of coronary artery disease, hypertension, hyperlipidemia, T2DM, and CKD stage 4 who presents via walk-in for evaluation of abdominal pain.    For the last 10 days the patient  "has had lower abdominal pain/cramping that is worse while he is lying in bed. It has made it difficult for him to sleep and was at it's worse last night. However, right now the pain is a 1/10. He has had increased flatulence with this but took omeprazole which relieved the gas. He has not had any GERD symptoms. He has had some non-bloody loose stool recently. The patient was seen at Urgent Care earlier today, had an abdominal x-ray that was unremarkable, and was sent here for further workup. His abdominal surgical history includes an appendectomy.     He denies any other complaints at this time.     Per chart review, the patient was seen earlier today at  [facility] [place] for this abdominal pain. Abdominal xray showed \"No free air is seen below the hemidiaphragms. Lung bases are clear. Bowel gas pattern is within normal limits. No evidence of obstruction.\" The patient was sent here for further workup.      PAST MEDICAL HISTORY:  History reviewed. No pertinent past medical history.    PAST SURGICAL HISTORY:  Past Surgical History:   Procedure Laterality Date    APPENDECTOMY         CURRENT MEDICATIONS:    Prior to Admission Medications   Prescriptions Last Dose Informant Patient Reported? Taking?   allopurinol (ZYLOPRIM) 100 MG tablet   Yes No   Sig: Take 100 mg by mouth daily   aspirin 81 MG EC tablet   Yes No   Sig: Take 81 mg by mouth daily   fluocinonide (LIDEX) 0.05 % external cream   Yes No   Sig: Apply topically as needed (Rash, Irritation)   fluticasone (FLONASE) 50 MCG/ACT nasal spray   Yes No   Sig: Spray 2 sprays into both nostrils at bedtime   glipiZIDE (GLUCOTROL XL) 5 MG 24 hr tablet   Yes No   Sig: Take 2 tablets by mouth daily   isosorbide mononitrate (IMDUR) 30 MG 24 hr tablet   No No   Sig: Take 1 tablet (30 mg) by mouth daily   linagliptin (TRADJENTA) 5 MG TABS tablet   Yes No   Sig: Take 5 mg by mouth daily   lisinopril-hydrochlorothiazide (ZESTORETIC) 10-12.5 MG tablet   Yes No   Sig: " "Take 1 tablet by mouth 2 times daily   metoprolol succinate ER (TOPROL XL) 50 MG 24 hr tablet   Yes No   Sig: Take 50 mg by mouth daily   nitroGLYcerin (NITROSTAT) 0.4 MG sublingual tablet   No No   Sig: For chest pain place 1 tablet under the tongue every 5 minutes for 3 doses. If symptoms persist 5 minutes after 1st dose call 911.   predniSONE (DELTASONE) 5 MG tablet   Yes No   Sig: Take 5 mg by mouth daily   simvastatin (ZOCOR) 40 MG tablet   Yes No   Sig: Take 40 mg by mouth at bedtime      Facility-Administered Medications: None       ALLERGIES:  No Known Allergies    FAMILY HISTORY:  History reviewed. No pertinent family history.    SOCIAL HISTORY:  Social History     Tobacco Use    Smoking status: Former     Types: Cigarettes, Pipe    Smokeless tobacco: Never   Substance Use Topics    Drug use: Never        VITALS:  Patient Vitals for the past 24 hrs:   BP Temp Temp src Pulse Resp SpO2 Height Weight   10/30/24 1621 (!) 168/79 -- -- 80 14 99 % -- --   10/30/24 1327 (!) 168/81 97  F (36.1  C) Temporal 82 18 95 % 1.676 m (5' 6\") 78.1 kg (172 lb 3.2 oz)       PHYSICAL EXAM    General Appearance: Well-appearing, well-nourished, no acute distress   Head:  Normocephalic  Cardio:  Regular rate and rhythm, hypertensive  Pulm:  No respiratory distress  Back: No CVA tenderness, normal ROM  Abdomen:  Soft, non distended,no rebound or guarding. Mild LLQ and suprapubic abdominal pain.  Extremities: Normal gait  Skin:  Skin warm, dry, no rashes  Neuro:  Alert and oriented ×3     RADIOLOGY/LABS:  Reviewed all pertinent imaging. Please see official radiology report. All pertinent labs reviewed and interpreted.    Results for orders placed or performed during the hospital encounter of 10/30/24   CT Abdomen Pelvis w/o Contrast    Impression    IMPRESSION:     1.  Acute uncomplicated sigmoid diverticulitis.    2.  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 There is associated severe chronic right renal cortical   thinning. No obstructing urinary calculi.   Comprehensive metabolic panel   Result Value Ref Range    Sodium 139 135 - 145 mmol/L    Potassium 4.8 3.4 - 5.3 mmol/L    Carbon Dioxide (CO2) 26 22 - 29 mmol/L    Anion Gap 13 7 - 15 mmol/L    Urea Nitrogen 34.5 (H) 8.0 - 23.0 mg/dL    Creatinine 2.16 (H) 0.67 - 1.17 mg/dL    GFR Estimate 29 (L) >60 mL/min/1.73m2    Calcium 10.5 (H) 8.8 - 10.4 mg/dL    Chloride 100 98 - 107 mmol/L    Glucose 308 (H) 70 - 99 mg/dL    Alkaline Phosphatase 66 40 - 150 U/L    AST 18 0 - 45 U/L    ALT 26 0 - 70 U/L    Protein Total 7.0 6.4 - 8.3 g/dL    Albumin 4.3 3.5 - 5.2 g/dL    Bilirubin Total 0.5 <=1.2 mg/dL   Result Value Ref Range    Lipase 41 13 - 60 U/L   UA with Microscopic reflex to Culture    Specimen: Urine, Clean Catch   Result Value Ref Range    Color Urine Light Yellow Colorless, Straw, Light Yellow, Yellow    Appearance Urine Clear Clear    Glucose Urine 1000 (A) Negative mg/dL    Bilirubin Urine Negative Negative    Ketones Urine Negative Negative mg/dL    Specific Gravity Urine 1.013 1.001 - 1.030    Blood Urine Negative Negative    pH Urine 6.0 5.0 - 7.0    Protein Albumin Urine 20 (A) Negative mg/dL    Urobilinogen Urine <2.0 <2.0 mg/dL    Nitrite Urine Negative Negative    Leukocyte Esterase Urine Negative Negative    Bacteria Urine Few (A) None Seen /HPF    RBC Urine <1 <=2 /HPF    WBC Urine 0 <=5 /HPF   CBC with platelets and differential   Result Value Ref Range    WBC Count 11.0 4.0 - 11.0 10e3/uL    RBC Count 3.06 (L) 4.40 - 5.90 10e6/uL    Hemoglobin 10.9 (L) 13.3 - 17.7 g/dL    Hematocrit 32.4 (L) 40.0 - 53.0 %     (H) 78 - 100 fL    MCH 35.6 (H) 26.5 - 33.0 pg    MCHC 33.6 31.5 - 36.5 g/dL    RDW 14.4 10.0 - 15.0 %    Platelet Count 136 (L) 150 - 450 10e3/uL    % Neutrophils 85 %    % Lymphocytes 9 %    % Monocytes 4 %    %  Eosinophils 0 %    % Basophils 0 %    % Immature Granulocytes 1 %    NRBCs per 100 WBC 0 <1 /100    Absolute Neutrophils 9.4 (H) 1.6 - 8.3 10e3/uL    Absolute Lymphocytes 1.0 0.8 - 5.3 10e3/uL    Absolute Monocytes 0.5 0.0 - 1.3 10e3/uL    Absolute Eosinophils 0.0 0.0 - 0.7 10e3/uL    Absolute Basophils 0.0 0.0 - 0.2 10e3/uL    Absolute Immature Granulocytes 0.1 <=0.4 10e3/uL    Absolute NRBCs 0.0 10e3/uL   Extra Blue Top Tube   Result Value Ref Range    Hold Specimen JIC    Extra Red Top Tube   Result Value Ref Range    Hold Specimen JIC        The creation of this record is based on the scribe s observations of the work being performed by Rosie Zavala MD and the provider s statements to them. It was created on her behalf by Eddie Eid, a trained medical scribe. This document has been checked and approved by the attending provider.    Rosie Zavala MD  Emergency Medicine  [facility] EMERGENCY DEPARTMENT  [address] [place] [id number] [phone]  Dept: [phone]     Rosie Zavala MD  10/30/24 6155

## 2024-10-30 NOTE — DISCHARGE INSTRUCTIONS
On CT scan you have evidence of mild diverticulitis.  Take antibiotics as prescribed.    Your CT scan does also show evidence of urine backing up into the right ureter and kidney.  You will need follow-up with urology.  Referral provided.

## 2024-10-31 ENCOUNTER — TELEPHONE (OUTPATIENT)
Dept: UROLOGY | Facility: CLINIC | Age: 89
End: 2024-10-31
Payer: COMMERCIAL

## 2024-10-31 NOTE — TELEPHONE ENCOUNTER
[facility] Call Center    Phone Message    May a detailed message be left on voicemail: no     Reason for Call: Other: Patient called to schedule his referral appointment for Hydroureteronephrosis [N13.30]. However writer does not see that dx on protocols. Please call patient back to schedule with the most appropriate provider.     Action Taken: Other: [facility] kidney stone inst    Travel Screening: Not Applicable     Date of Service:

## 2024-11-08 ENCOUNTER — APPOINTMENT (OUTPATIENT)
Dept: CT IMAGING | Facility: HOSPITAL | Age: 89
End: 2024-11-08
Attending: EMERGENCY MEDICINE
Payer: COMMERCIAL

## 2024-11-08 ENCOUNTER — HOSPITAL ENCOUNTER (EMERGENCY)
Facility: HOSPITAL | Age: 89
Discharge: HOME | End: 2024-11-08
Attending: EMERGENCY MEDICINE | Admitting: EMERGENCY MEDICINE
Payer: COMMERCIAL

## 2024-11-08 VITALS
BODY MASS INDEX: 27.21 KG/M2 | WEIGHT: 168.6 LBS | HEART RATE: 80 BPM | SYSTOLIC BLOOD PRESSURE: 130 MMHG | TEMPERATURE: 98.3 F | OXYGEN SATURATION: 98 % | RESPIRATION RATE: 20 BRPM | DIASTOLIC BLOOD PRESSURE: 73 MMHG

## 2024-11-08 DIAGNOSIS — K57.92 DIVERTICULITIS: ICD-10-CM

## 2024-11-08 DIAGNOSIS — R10.32 LLQ ABDOMINAL PAIN: ICD-10-CM

## 2024-11-08 LAB
ALBUMIN SERPL BCG-MCNC: 4.1 G/DL (ref 3.5–5.2)
ALP SERPL-CCNC: 58 U/L (ref 40–150)
ALT SERPL W P-5'-P-CCNC: 26 U/L (ref 0–70)
ANION GAP SERPL CALCULATED.3IONS-SCNC: 12 MMOL/L (ref 7–15)
AST SERPL W P-5'-P-CCNC: 18 U/L (ref 0–45)
BASOPHILS # BLD AUTO: 0 10E3/UL (ref 0–0.2)
BASOPHILS NFR BLD AUTO: 0 %
BILIRUB DIRECT SERPL-MCNC: <0.2 MG/DL (ref 0–0.3)
BILIRUB SERPL-MCNC: 0.6 MG/DL
BUN SERPL-MCNC: 37 MG/DL (ref 8–23)
CALCIUM SERPL-MCNC: 10.6 MG/DL (ref 8.8–10.4)
CHLORIDE SERPL-SCNC: 99 MMOL/L (ref 98–107)
CREAT SERPL-MCNC: 2.38 MG/DL (ref 0.67–1.17)
EGFRCR SERPLBLD CKD-EPI 2021: 26 ML/MIN/1.73M2
EOSINOPHIL # BLD AUTO: 0 10E3/UL (ref 0–0.7)
EOSINOPHIL NFR BLD AUTO: 0 %
ERYTHROCYTE [DISTWIDTH] IN BLOOD BY AUTOMATED COUNT: 13.8 % (ref 10–15)
GLUCOSE SERPL-MCNC: 208 MG/DL (ref 70–99)
HCO3 SERPL-SCNC: 26 MMOL/L (ref 22–29)
HCT VFR BLD AUTO: 30.6 % (ref 40–53)
HGB BLD-MCNC: 10.2 G/DL (ref 13.3–17.7)
HOLD SPECIMEN: NORMAL
HOLD SPECIMEN: NORMAL
IMM GRANULOCYTES # BLD: 0.1 10E3/UL
IMM GRANULOCYTES NFR BLD: 1 %
LACTATE SERPL-SCNC: 1.1 MMOL/L (ref 0.7–2)
LIPASE SERPL-CCNC: 31 U/L (ref 13–60)
LYMPHOCYTES # BLD AUTO: 1.2 10E3/UL (ref 0.8–5.3)
LYMPHOCYTES NFR BLD AUTO: 8 %
MCH RBC QN AUTO: 35.2 PG (ref 26.5–33)
MCHC RBC AUTO-ENTMCNC: 33.3 G/DL (ref 31.5–36.5)
MCV RBC AUTO: 106 FL (ref 78–100)
MONOCYTES # BLD AUTO: 0.7 10E3/UL (ref 0–1.3)
MONOCYTES NFR BLD AUTO: 5 %
NEUTROPHILS # BLD AUTO: 12.4 10E3/UL (ref 1.6–8.3)
NEUTROPHILS NFR BLD AUTO: 86 %
NRBC # BLD AUTO: 0 10E3/UL
NRBC BLD AUTO-RTO: 0 /100
PLATELET # BLD AUTO: 149 10E3/UL (ref 150–450)
POTASSIUM SERPL-SCNC: 4.3 MMOL/L (ref 3.4–5.3)
PROT SERPL-MCNC: 6.9 G/DL (ref 6.4–8.3)
RBC # BLD AUTO: 2.9 10E6/UL (ref 4.4–5.9)
SODIUM SERPL-SCNC: 137 MMOL/L (ref 135–145)
WBC # BLD AUTO: 14.4 10E3/UL (ref 4–11)

## 2024-11-08 PROCEDURE — 82248 BILIRUBIN DIRECT: CPT | Performed by: EMERGENCY MEDICINE

## 2024-11-08 PROCEDURE — 83605 ASSAY OF LACTIC ACID: CPT | Performed by: EMERGENCY MEDICINE

## 2024-11-08 PROCEDURE — 36415 COLL VENOUS BLD VENIPUNCTURE: CPT | Performed by: EMERGENCY MEDICINE

## 2024-11-08 PROCEDURE — 250N000013 HC RX MED GY IP 250 OP 250 PS 637: Performed by: EMERGENCY MEDICINE

## 2024-11-08 PROCEDURE — 99284 EMERGENCY DEPT VISIT MOD MDM: CPT | Mod: 25

## 2024-11-08 PROCEDURE — 82947 ASSAY GLUCOSE BLOOD QUANT: CPT | Performed by: EMERGENCY MEDICINE

## 2024-11-08 PROCEDURE — 84155 ASSAY OF PROTEIN SERUM: CPT | Performed by: EMERGENCY MEDICINE

## 2024-11-08 PROCEDURE — 74176 CT ABD & PELVIS W/O CONTRAST: CPT

## 2024-11-08 PROCEDURE — 96360 HYDRATION IV INFUSION INIT: CPT

## 2024-11-08 PROCEDURE — 83690 ASSAY OF LIPASE: CPT | Performed by: EMERGENCY MEDICINE

## 2024-11-08 PROCEDURE — 85004 AUTOMATED DIFF WBC COUNT: CPT | Performed by: EMERGENCY MEDICINE

## 2024-11-08 PROCEDURE — 258N000003 HC RX IP 258 OP 636: Performed by: EMERGENCY MEDICINE

## 2024-11-08 PROCEDURE — 96361 HYDRATE IV INFUSION ADD-ON: CPT

## 2024-11-08 RX ORDER — CIPROFLOXACIN 500 MG/1
500 TABLET, FILM COATED ORAL ONCE
Status: COMPLETED | OUTPATIENT
Start: 2024-11-08 | End: 2024-11-08

## 2024-11-08 RX ORDER — METRONIDAZOLE 500 MG/1
500 TABLET ORAL ONCE
Status: COMPLETED | OUTPATIENT
Start: 2024-11-08 | End: 2024-11-08

## 2024-11-08 RX ORDER — ONDANSETRON 2 MG/ML
4 INJECTION INTRAMUSCULAR; INTRAVENOUS EVERY 30 MIN PRN
Status: DISCONTINUED | OUTPATIENT
Start: 2024-11-08 | End: 2024-11-08 | Stop reason: HOSPADM

## 2024-11-08 RX ORDER — METRONIDAZOLE 500 MG/1
500 TABLET ORAL 3 TIMES DAILY
Qty: 30 TABLET | Refills: 0 | Status: SHIPPED | OUTPATIENT
Start: 2024-11-08 | End: 2024-11-18

## 2024-11-08 RX ORDER — CIPROFLOXACIN 500 MG/1
500 TABLET, FILM COATED ORAL DAILY
Qty: 10 TABLET | Refills: 0 | Status: SHIPPED | OUTPATIENT
Start: 2024-11-08 | End: 2024-11-18

## 2024-11-08 RX ORDER — ACETAMINOPHEN 325 MG/1
975 TABLET ORAL ONCE
Status: COMPLETED | OUTPATIENT
Start: 2024-11-08 | End: 2024-11-08

## 2024-11-08 RX ORDER — ONDANSETRON 4 MG/1
4 TABLET, ORALLY DISINTEGRATING ORAL EVERY 6 HOURS PRN
Qty: 10 TABLET | Refills: 0 | Status: SHIPPED | OUTPATIENT
Start: 2024-11-08

## 2024-11-08 RX ADMIN — SODIUM CHLORIDE 500 ML: 9 INJECTION, SOLUTION INTRAVENOUS at 17:28

## 2024-11-08 RX ADMIN — ACETAMINOPHEN 975 MG: 325 TABLET ORAL at 17:27

## 2024-11-08 RX ADMIN — METRONIDAZOLE 500 MG: 500 TABLET ORAL at 19:22

## 2024-11-08 RX ADMIN — CIPROFLOXACIN 500 MG: 500 TABLET, FILM COATED ORAL at 19:18

## 2024-11-08 ASSESSMENT — COLUMBIA-SUICIDE SEVERITY RATING SCALE - C-SSRS
6. HAVE YOU EVER DONE ANYTHING, STARTED TO DO ANYTHING, OR PREPARED TO DO ANYTHING TO END YOUR LIFE?: NO
2. HAVE YOU ACTUALLY HAD ANY THOUGHTS OF KILLING YOURSELF IN THE PAST MONTH?: NO
1. IN THE PAST MONTH, HAVE YOU WISHED YOU WERE DEAD OR WISHED YOU COULD GO TO SLEEP AND NOT WAKE UP?: NO

## 2024-11-08 ASSESSMENT — ENCOUNTER SYMPTOMS
ABDOMINAL PAIN: 1
DIAPHORESIS: 1
NAUSEA: 0
FEVER: 0
VOMITING: 0

## 2024-11-08 ASSESSMENT — ACTIVITIES OF DAILY LIVING (ADL)
ADLS_ACUITY_SCORE: 0
ADLS_ACUITY_SCORE: 0

## 2024-11-08 NOTE — ED PROVIDER NOTES
Emergency Department Encounter     Evaluation Date & Time:   No admission date for patient encounter.    CHIEF COMPLAINT:  Abdominal Pain      Triage Note:     FINAL IMPRESSION:    ICD-10-CM    1. LLQ abdominal pain  R10.32           Impression and Plan     ED COURSE & MEDICAL DECISION MAKIN:43 PM I met with the patient to obtain patient history and performed a physical exam. Discussed plan for ED work up including potential diagnostic studies and interventions.        ED Course as of 24 So, the patient chart was reviewed and I do see that on  he was here for apparent diverticulitis.  He was treated at home with antibiotics.  Sounds like he was very slow to improve and was still having hard stools until the day he finished his medication he actually was improved 2 days prior to presentation.  Now right away his pain is back again.  He did have his last bowel movement yesterday which was normal and did not worsen his pain so it does not seem to be bad constipation at this point.  He is not throwing up but if his diverticulitis has worsened he will likely need admission to the hospital.  If it appears similar we could try a longer course of antibiotics at home since he had done well with that and had tolerated his symptoms well.  He does however appear rather dehydrated which may also be reason for admission and his pallor I think in the waiting room is likely in large part due to dehydration so we are going to start some fluids here.  I am reviewing his chart to make sure there is no history of heart failure that we would need to do metered doses.    Patient will be signed out to Dr. Hope to follow up on imaging.  He may need admission as noted preivously versus discharge home.  Seen in itially in waiting room so starting pain control with acetaminophen.       At the conclusion of the encounter I discussed the results of all the tests and the disposition.  "The questions were answered. The patient or family acknowledged understanding and was agreeable with the care plan.          0 minutes of critical care time        MEDICATIONS GIVEN IN THE EMERGENCY DEPARTMENT:  Medications   ondansetron (ZOFRAN) injection 4 mg (has no administration in time range)   acetaminophen (TYLENOL) tablet 975 mg (975 mg Oral $Given 11/8/24 1727)   sodium chloride 0.9% BOLUS 500 mL (500 mLs Intravenous $New Bag 11/8/24 1728)       NEW PRESCRIPTIONS STARTED AT TODAY'S ED VISIT:  New Prescriptions    No medications on file       HPI     HPI     Lino Llanos is a 89 year old male with a pertinent history of hypertension, hyperlipidemia, chronic kidney disease, coronary artery disease, type 2 diabetes who presents to this ED walk-in for evaluation of abdominal pain.     Patient presents to the ED for concerns of LLQ abdominal pain. He is also diaphoretic starting last night. He states that the pain started 10/30/24 and he was seen at . They sent him to and ED and he was CT scanned and given antibiotics. He states that the antibiotics were helping but he ran out on 11/6/24 and then the pain came back at the same level that it was at originally. He states that \"it came back with a vengeance\". The pain is constant. He also had some \"severe constipation\" according to his daughter that ended 2 days ago. He states that he does not have an appetite and he has been getting gas when he eats for the last 2-3 months. He had a BM yesterday (11/7/24) and it was light brown. He did not have a BM today. His PCP is [facility] in [place].     Patient denies any nausea, vomiting, and fever. Patient states no other complaints or concerns at this time.     Per Chart Review:   11/8/24, [facility]: Patient presents for 3 days of LLQ abdominal pain. 5/10. Acute diverticulitis: Left lower quadrant abdominal pain returned 1 day after patient finished course of antibiotics for mild " diverticulitis. Moderate tenderness on palpation of left lower quadrant, guarding on exam. No rebound tenderness. With older age, failing outpatient treatment, and difficulty getting imaging on a Friday afternoon, advised patient and daughter to go to the ED. He likely needs repeat CT abdomen and may need treatment in the hospital. They will do this.     REVIEW OF SYSTEMS:  Review of Systems   Constitutional:  Positive for diaphoresis. Negative for fever.   Gastrointestinal:  Positive for abdominal pain. Negative for nausea and vomiting.   All other systems reviewed and are negative.    remainder of systems are all otherwise negative.        Medical History     No past medical history on file.    Past Surgical History:   Procedure Laterality Date    APPENDECTOMY         No family history on file.    Social History     Tobacco Use    Smoking status: Former     Types: Cigarettes, Pipe    Smokeless tobacco: Never   Substance Use Topics    Drug use: Never       allopurinol (ZYLOPRIM) 100 MG tablet  aspirin 81 MG EC tablet  fluocinonide (LIDEX) 0.05 % external cream  fluticasone (FLONASE) 50 MCG/ACT nasal spray  glipiZIDE (GLUCOTROL XL) 5 MG 24 hr tablet  isosorbide mononitrate (IMDUR) 30 MG 24 hr tablet  linagliptin (TRADJENTA) 5 MG TABS tablet  lisinopril-hydrochlorothiazide (ZESTORETIC) 10-12.5 MG tablet  metoprolol succinate ER (TOPROL XL) 50 MG 24 hr tablet  nitroGLYcerin (NITROSTAT) 0.4 MG sublingual tablet  predniSONE (DELTASONE) 5 MG tablet  simvastatin (ZOCOR) 40 MG tablet        Physical Exam     First Vitals:  Patient Vitals for the past 24 hrs:   BP Temp Temp src Pulse Resp SpO2 Weight   11/08/24 1642 127/76 98.3  F (36.8  C) Oral 103 20 96 % 76.5 kg (168 lb 9.6 oz)       PHYSICAL EXAM:   Constitutional:   Sitting in chair comfortably.    HENT:  Normocephalic, posterior pharynx wnl, mucous membranes pale pink and tachy   Eyes:  PERRL, EOMI, Conjunctiva normal, No discharge, no scleral icterus.  Respiratory:   Breathing easily, Lungs clear to auscultation, decreased in left upper lobe.    Cardiovascular:  Distant heart sounds. Regular rate and rhythm nl s1s2 0 murmurs, rubs, or gallops.  Peripheral pulses dp, pt, and radial are wnl.  no peripheral edema   GI:  Bowel sounds normal, Soft, palpation of abdomen radiates to LLQ, No flank tenderness, nondistended.  :No CVA tenderness.   Musculoskeletal:  Moves all extremities.  No erythematous or swollen major joints,   Integument:  Pale skin  Neurologic:  Alert & oriented x 3, Normal motor function, Normal sensory function, No focal deficits noted. Normal speech.  Psychiatric:  Affect normal, Judgment normal, Mood normal.     Results     LAB AND RADIOLOGY:  All pertinent labs reviewed and interpreted  Results for orders placed or performed during the hospital encounter of 11/08/24   Lactic acid whole blood with 1x repeat in 2 hr when >2     Status: Normal   Result Value Ref Range    Lactic Acid, Initial 1.1 0.7 - 2.0 mmol/L   [place] Draw     Status: None (In process)    Narrative    The following orders were created for panel order [place] Draw.  Procedure                               Abnormality         Status                     ---------                               -----------         ------                     Extra Red Top Tube[[id number]]                               In process                 Extra Green Top (Lithium...[[id number]]                                                 Extra Purple Top Tube[[id number]]                            In process                   Please view results for these tests on the individual orders.   CBC with platelets and differential     Status: Abnormal   Result Value Ref Range    WBC Count 14.4 (H) 4.0 - 11.0 10e3/uL    RBC Count 2.90 (L) 4.40 - 5.90 10e6/uL    Hemoglobin 10.2 (L) 13.3 - 17.7 g/dL    Hematocrit 30.6 (L) 40.0 - 53.0 %     (H) 78 - 100 fL    MCH 35.2 (H) 26.5 - 33.0 pg    MCHC 33.3 31.5 - 36.5 g/dL    RDW 13.8  10.0 - 15.0 %    Platelet Count 149 (L) 150 - 450 10e3/uL    % Neutrophils 86 %    % Lymphocytes 8 %    % Monocytes 5 %    % Eosinophils 0 %    % Basophils 0 %    % Immature Granulocytes 1 %    NRBCs per 100 WBC 0 <1 /100    Absolute Neutrophils 12.4 (H) 1.6 - 8.3 10e3/uL    Absolute Lymphocytes 1.2 0.8 - 5.3 10e3/uL    Absolute Monocytes 0.7 0.0 - 1.3 10e3/uL    Absolute Eosinophils 0.0 0.0 - 0.7 10e3/uL    Absolute Basophils 0.0 0.0 - 0.2 10e3/uL    Absolute Immature Granulocytes 0.1 <=0.4 10e3/uL    Absolute NRBCs 0.0 10e3/uL   CBC with platelets differential     Status: Abnormal    Narrative    The following orders were created for panel order CBC with platelets differential.  Procedure                               Abnormality         Status                     ---------                               -----------         ------                     CBC with platelets and d...[[id number]]  Abnormal            Final result                 Please view results for these tests on the individual orders.         [facility] Documentation     Medical Decision Making  Obtained supplemental history:Supplemental history obtained?: Family Member/Significant Other  Reviewed external records: External records reviewed?: Outpatient Record: 11/8/24, [facility] impacted by chronic illness:Chronic Kidney Disease, Diabetes, Heart Disease, Hyperlipidemia, and Hypertension  Did you consider but not order tests?: Work up considered but not performed and documented in chart, if applicable  Did you interpret images independently?: Independent interpretation of ECG and images noted in documentation, when applicable.  Consultation discussion with other provider:Did you involve another provider (consultant, , pharmacy, etc.)?: I discussed the care with another health care provider, see documentation for details.  Admission considered. Patient was signed out to the oncoming physician, disposition  pending.    The creation of this record is based on the scribe s observations of the work being performed by Jonathan Harrington and the provider s statements to them. This document has been checked and approved by MD Janay Cordon MD  Emergency Medicine  [facility] EMERGENCY DEPARTMENT         Janay Faye MD  11/08/24 0779

## 2024-11-08 NOTE — ED TRIAGE NOTES
Amb to triage.  Pt states here 1 wk ago for same.  C/o lower L abd pain with dx of diverticulitis.  Was given 1 wk of treatment antibx with some relief but now the med is gone and sx have returned.       Triage Assessment (Adult)       Row Name 11/08/24 4127          Triage Assessment    Airway WDL WDL        Respiratory WDL    Respiratory WDL WDL        Skin Circulation/Temperature WDL    Skin Circulation/Temperature WDL WDL        Cardiac WDL    Cardiac WDL WDL        Peripheral/Neurovascular WDL    Peripheral Neurovascular WDL WDL        Cognitive/Neuro/Behavioral WDL    Cognitive/Neuro/Behavioral WDL WDL

## 2024-11-09 NOTE — DISCHARGE INSTRUCTIONS
For the next 10 days while you are taking the antibiotics, please only take half of your glipizide, that would be 1 tablet instead of 2.  It is rare, but some patients experience a decrease in her blood sugar when they take glipizide with just antibiotic, and this will help prevent that from happening.    Antibiotics take about 24 hours to fully kick in.  That is when the inflammation and infection slows down.  If you notice that your symptoms are getting worse after 24 hours, or if at any time you have nausea, vomiting, or are unable to keep down antibiotics, I would like you to come back to the emergency department.

## 2024-11-09 NOTE — ED PROVIDER NOTES
Progress Note    The patient was signed out to me by Dr. Carroll.    ED Course as of 11/08/24 2301 Fri Nov 08, 2024 1756 Signout: 89 yo M with recent diverticulitis, on augmentin. Finished abx 2 days ago and pain returned. WBC is back up, and repeat CT ordered.   1843 CT Abdomen Pelvis w/o Contrast  1.  New focal area of acute diverticulitis within the distal descending colon. Stable focal area of diverticulitis within the proximal sigmoid colon. No evidence for perforation.  2.  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   1852 Rechecked the patient.  I suspect recurrence of symptoms is due to failure of Augmentin rather than the duration of course.  We discussed CT results, plan to start on Cipro and Flagyl, we discussed return precautions.   1904 I renally dosed the Cipro, Flagyl is unchanged.  Send home with Zofran as well.  Return precautions discussed.  1 week follow-up recommended.  I also recommended half dose glipizide for the next week to prevent hypoglycemia.       Final diagnoses:   LLQ abdominal pain   Diverticulitis       Catracho Hope MD  Emergency Medicine  [facility]       Catracho Hope MD  11/08/24 9310

## 2024-11-09 NOTE — ED NOTES
Pt presents for evaluation of lower abdominal pain. Recent hx of diverticulitis with treatment with antibiotics. His pain was somewhat improved, however it is now severe again.   SKIN: WNL.   HEENT: Normocephalic, alert and oriented x 3.   CHEST: Symmetrical rise. No reported CP or SOB.  ABD: Some tenderness with palpation. No reported N&V or diarrhea.No guarding.   EXT: RODRIGUEZ x 4  Rest of exam unremarkable.

## 2024-11-20 ENCOUNTER — OFFICE VISIT (OUTPATIENT)
Dept: UROLOGY | Facility: CLINIC | Age: 89
End: 2024-11-20
Payer: COMMERCIAL

## 2024-11-20 ENCOUNTER — OFFICE VISIT (OUTPATIENT)
Dept: CARDIOLOGY | Facility: CLINIC | Age: 89
End: 2024-11-20
Attending: INTERNAL MEDICINE
Payer: COMMERCIAL

## 2024-11-20 VITALS
BODY MASS INDEX: 27.33 KG/M2 | OXYGEN SATURATION: 93 % | WEIGHT: 169.3 LBS | HEART RATE: 82 BPM | DIASTOLIC BLOOD PRESSURE: 60 MMHG | RESPIRATION RATE: 20 BRPM | SYSTOLIC BLOOD PRESSURE: 104 MMHG

## 2024-11-20 VITALS — SYSTOLIC BLOOD PRESSURE: 140 MMHG | TEMPERATURE: 98 F | DIASTOLIC BLOOD PRESSURE: 72 MMHG | HEART RATE: 80 BPM

## 2024-11-20 DIAGNOSIS — N18.4 CHRONIC KIDNEY DISEASE, STAGE IV (SEVERE) (H): ICD-10-CM

## 2024-11-20 DIAGNOSIS — E11.9 TYPE 2 DIABETES MELLITUS WITHOUT COMPLICATION, WITHOUT LONG-TERM CURRENT USE OF INSULIN (H): ICD-10-CM

## 2024-11-20 DIAGNOSIS — I25.119 CORONARY ARTERY DISEASE INVOLVING NATIVE CORONARY ARTERY OF NATIVE HEART WITH ANGINA PECTORIS (H): ICD-10-CM

## 2024-11-20 DIAGNOSIS — Q62.11 HYDRONEPHROSIS WITH URETEROPELVIC JUNCTION (UPJ) OBSTRUCTION: ICD-10-CM

## 2024-11-20 DIAGNOSIS — E78.5 DYSLIPIDEMIA, GOAL LDL BELOW 70: ICD-10-CM

## 2024-11-20 DIAGNOSIS — I10 BENIGN ESSENTIAL HYPERTENSION: Primary | ICD-10-CM

## 2024-11-20 PROCEDURE — 99203 OFFICE O/P NEW LOW 30 MIN: CPT | Performed by: STUDENT IN AN ORGANIZED HEALTH CARE EDUCATION/TRAINING PROGRAM

## 2024-11-20 RX ORDER — CICLOPIROX OLAMINE 7.7 MG/G
CREAM TOPICAL 2 TIMES DAILY
COMMUNITY
Start: 2024-08-23

## 2024-11-20 RX ORDER — SEMAGLUTIDE 0.68 MG/ML
0.5 INJECTION, SOLUTION SUBCUTANEOUS
COMMUNITY
Start: 2024-05-29

## 2024-11-20 RX ORDER — ISOSORBIDE MONONITRATE 30 MG/1
30 TABLET, EXTENDED RELEASE ORAL DAILY
Qty: 90 TABLET | Refills: 3 | Status: SHIPPED | OUTPATIENT
Start: 2024-11-20

## 2024-11-20 NOTE — PROGRESS NOTES
Chief Complaint:    Hydronephrosis           Consult or Referral:     Mr. Lino Llanos is a 89 year old male seen at the request of Dr. Zavala.         History of Present Illness:     Lino Llanos is a 89 year old male being seen for right gross hydronephrosis.  Duration of problem: 4 years  Previous treatments: None     accompanied by his son  Reviewed previous notes from Dr. Kwon    Lino presents today for evaluation of right-sided gross hydronephrosis detected on a recent CT scan  He was being worked up for diverticulitis when this was discovered  He denies any significant pain related to the same  No prior kidney surgeries or stone disease  Prior CT evaluation revealed hydronephrotic kidney on the right with multiple cysts but not to the extent seen on recent CT scan               Past Medical History:   No past medical history on file.         Past Surgical History:     Past Surgical History:   Procedure Laterality Date    APPENDECTOMY              Medications     Current Outpatient Medications   Medication Sig Dispense Refill    allopurinol (ZYLOPRIM) 100 MG tablet Take 100 mg by mouth daily      aspirin 81 MG EC tablet Take 81 mg by mouth daily      ciclopirox (LOPROX) 0.77 % cream Apply topically 2 times daily.      fluocinonide (LIDEX) 0.05 % external cream Apply topically as needed (Rash, Irritation)      fluticasone (FLONASE) 50 MCG/ACT nasal spray Spray 2 sprays into both nostrils at bedtime      glipiZIDE (GLUCOTROL XL) 5 MG 24 hr tablet Take 2 tablets by mouth daily      isosorbide mononitrate (IMDUR) 30 MG 24 hr tablet Take 1 tablet (30 mg) by mouth daily. 90 tablet 3    lisinopril-hydrochlorothiazide (ZESTORETIC) 10-12.5 MG tablet Take 1 tablet by mouth 2 times daily      metoprolol succinate ER (TOPROL XL) 50 MG 24 hr tablet Take 50 mg by mouth daily      omeprazole (PRILOSEC) 20 MG DR capsule Take 20 mg by mouth daily.      OZEMPIC, 0.25 OR 0.5 MG/DOSE, 2 MG/3ML pen Inject 0.5 mg  subcutaneously every 7 days.      predniSONE (DELTASONE) 5 MG tablet Take 5 mg by mouth daily      simvastatin (ZOCOR) 40 MG tablet Take 40 mg by mouth at bedtime      UNABLE TO FIND Apply topically 2 times daily.   calcipotriene 0.004%-fluorouracil 4% ointment     Patient sig: Apply to top of head, forehead, cheeks and outer rims of ears. Use twice daily for 5 da      nitroGLYcerin (NITROSTAT) 0.4 MG sublingual tablet For chest pain place 1 tablet under the tongue every 5 minutes for 3 doses. If symptoms persist 5 minutes after 1st dose call 911. (Patient not taking: Reported on 11/20/2024) 30 tablet 1    ondansetron (ZOFRAN ODT) 4 MG ODT tab Take 1 tablet (4 mg) by mouth every 6 hours as needed for nausea. (Patient not taking: Reported on 11/20/2024) 10 tablet 0     No current facility-administered medications for this visit.            Family History:   No family history on file.         Social History:     Social History     Socioeconomic History    Marital status:      Spouse name: Not on file    Number of children: Not on file    Years of education: Not on file    Highest education level: Not on file   Occupational History    Not on file   Tobacco Use    Smoking status: Former     Types: Cigarettes, Pipe    Smokeless tobacco: Never   Substance and Sexual Activity    Alcohol use: Not on file    Drug use: Never    Sexual activity: Not on file   Other Topics Concern    Not on file   Social History Narrative    Not on file     Social Drivers of Health     Financial Resource Strain: Not on file   Food Insecurity: Not on file   Transportation Needs: Not on file   Physical Activity: Not on file   Stress: Not on file   Social Connections: Not on file   Interpersonal Safety: Not on file   Housing Stability: Not on file            Allergies:   Patient has no known allergies.         Review of Systems:  From intake questionnaire     Skin: negative  Eyes: negative  Ears/Nose/Throat: negative  Respiratory: No  "shortness of breath, dyspnea on exertion, cough, or hemoptysis  Cardiovascular: No chest pain or palpitations  Gastrointestinal: negative; no nausea/vomiting, constipation or diarrhea  Genitourinary: as per HPI  Musculoskeletal: negative  Neurologic: negative  Psychiatric: negative  Hematologic/Lymphatic/Immunologic: negative  Endocrine: negative         Physical Exam:     Patient is a 89 year old  male   Vitals: Blood pressure (!) 140/72, pulse 80, temperature 98  F (36.7  C), temperature source Temporal.  Constitutional: There is no height or weight on file to calculate BMI.  Alert, no acute distress, oriented, conversant  Eyes: no scleral icterus; extraocular muscles intact, moist conjunctivae  Neck: trachea midline, no thyromegaly  Ears/nose/mouth: throat/mouth:normal, good dentition  Respiratory: no respiratory distress, or pursed lip breathing  Cardiovascular: pulses strong and intact; no obvious jugular venous distension present  Gastrointestinal: soft, nontender, no organomegaly or masses,   Lymphatics: No inguinal adenopathy  Musculoskeletal: extremities normal, no peripheral edema  Skin: no suspicious lesions or rashes  Neuro: Alert, oriented, speech and mentation normal  Psych: affect and mood normal, alert and oriented to person, place and time  Gait: Normal  : deferred      Labs and Pathology:    The following labs were reviewed by me and discussed with the patient:    Significant for   Lab Results   Component Value Date    CR 2.38 11/08/2024    CR 2.16 10/30/2024    CR 2.04 01/02/2020     No results found for: \"PSA\"          Imaging:    The following imaging exams were independently viewed and interpreted by me and discussed with patient:  EXAM: CT ABDOMEN PELVIS W/O CONTRAST  LOCATION: [facility]  DATE: 11/8/2024     INDICATION: Acute diverticulitis. Initially improving on antibiotic therapy. No worsening left lower quadrant pain.  COMPARISON: CT abdomen and pelvis " 10/30/2024.  TECHNIQUE: CT scan of the abdomen and pelvis was performed without IV contrast. Multiplanar reformats were obtained. Dose reduction techniques were used.  CONTRAST: None.     FINDINGS:   LOWER CHEST: Mild dependent atelectasis.     HEPATOBILIARY: Stable 6 mm low-attenuation lesion within the inferior aspect of the left hepatic lobe which is too small to characterize but likely represents a small cyst.     PANCREAS: Normal.     SPLEEN: Normal.     ADRENAL GLANDS: Normal.     KIDNEYS/BLADDER: Stable, chronic appearing severe right hydronephrosis which appears to be secondary to chronic UPJ obstruction. Stable, severe right renal cortical atrophy. Stable benign-appearing right renal cysts. No left hydronephrosis.     BOWEL: Normal caliber small bowel. New eccentric wall thickening with mild pericolonic fat stranding at the distal descending colon with diverticular inflammation (images 110-126 series 3). Stable focal area of wall thickening with pericolonic stranding   and diverticular inflammation within the proximal sigmoid colon. Diverticula within the hepatic flexure the colon. Diverticulosis of the distal descending and sigmoid portions of the colon. No free air. No abscess.     LYMPH NODES: No lymphadenopathy.     VASCULATURE: Moderate calcified atherosclerotic changes. No abdominal aortic aneurysm.     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                                                                      IMPRESSION:   1.  New focal area of acute diverticulitis within the distal descending colon. Stable focal area of diverticulitis within the proximal sigmoid colon. No evidence for perforation.  2.  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         CT from 2020 January was also reviewed which showed similar  appearing hydronephrosis however the renal parenchyma at that time was thicker         Assessment and Plan:     Hydronephrosis with ureteropelvic junction (UPJ) obstruction  Not sure about the exact etiology of the hydronephrosis but looks like it is associated with a UPJ obstruction  He had multiple cysts in the kidney in the past with parapelvic cysts which may have influenced the obstruction to  He is asymptomatic and based on the CT assessment has appropriate thin parenchyma less than 5 mm in thickness  All of this implies that the kidney probably is nonfunctional  There is minimal change in his GFR between 2020 and 2024 and that this kidney may very well have been not functioning well at that time when the last CT was done  I discussed with him that we could do a renal scan with diuretic to assess the differential function however that would not change the way we treat him  I discussed this with him and he was agreeable to not do any additional testing  I encouraged him to reach out to us if he starts having any significant pain on the right side where we would think about interventions like stents or nephrostomies to drain that obstructed kidney  I do not think he is a good candidate for nephrectomy as he is asymptomatic, older and without any issues related to a UPJ obstruction  He expressed understanding and was agreeable he will follow-up with us as needed  - Adult Urology  Referral      Plan:  Follow-up as needed    Orders  No orders of the defined types were placed in this encounter.      Huseyin Garcia MD  [facility]      ==========================    Additional Billing and Coding Information:  Review of external notes as documented above   Review of the result(s) of each unique test - creatinine, CT    Independent interpretation of a test performed by another physician/other qualified health care professional (not separately reported) -independently  reviewed the CT from 2020 which showed hydronephrosis with multiple cysts            15 minutes spent by me on the date of the encounter doing chart review, review of test results, interpretation of tests, patient visit, documentation, and discussion with family     ==========================

## 2024-11-20 NOTE — PROGRESS NOTES
Click to link to [facility] HEART CARE NOTE       Assessment/Plan:   Coronary artery disease with stable angina: The patient had no chest pain over last 6 months.  Continue aspirin 81 mg daily, isosorbide mononitrate 30 mg daily, metoprolol succinate 50 mg daily.  Continue to monitor.  Benign essential hypertension: His blood pressure is is low sometimes, controlled metoprolol XL 50 mg daily, reduce lisinopril-hydrochlorothiazide 10-25 mg from a twice a day to once a day.  Continue to monitor his blood pressure.  Dyslipidemia: Continue simvastatin 40 mg at bedtime.  LDL was 63.  Stage IV CKD, type 2 diabetes: Follow-up with primary care physician.  No change in treatment today.    Thank you for the opportunity to be involved in the care of Lino Llanos. If you have any questions, please feel free to contact me.  I will see the patient again in 6 months and as needed    Much or all of the text in this note was generated through the use of Dragon Dictate voice-to-text software. Errors in spelling or words which seem out of context are unintentional.   Sound alike errors, in particular, may have escaped editing.       History of Present Illness:   It is my pleasure to see Lino Llanos at the [facility] clinic for routine cardiology follow-up. Lino Llanos is a 89 year old male with a medical history of coronary artery disease status post stent placement 20 years ago, benign essential hypertension, dyslipidemia, stage IV CKD.    The patient states that his chest pain has been well-controlled with isosorbide mononitrate 30 mg daily.  He did not have episode of chest pain over last 6 months.  He denies shortness of breath on exertion, palpitations, dizziness, orthopnea, PND.  He has no leg edema.  He started Ozempic for the management of diabetes and overweight.  He lost nearly 30 pounds from beginning of this year.  His blood pressure sometimes is low leading  to lightheadedness.  His pulse is in normal range.  LDL was controlled.      Past Medical History:     Patient Active Problem List   Diagnosis    Coronary artery disease involving native coronary artery of native heart with angina pectoris (H)    Benign essential hypertension    Dyslipidemia, goal LDL below 70    Type 2 diabetes mellitus without complication, without long-term current use of insulin (H)    Chronic kidney disease, stage IV (severe) (H)    Esophageal reflux    Allergic rhinitis    Gout    Hearing loss    Obesity       Past Surgical History:     Past Surgical History:   Procedure Laterality Date    APPENDECTOMY         Family History:   Reviewed: Both parents had CAD.  Mother had CABG at age 83.    Social History:    reports that he has quit smoking. His smoking use included cigarettes and pipe. He has never used smokeless tobacco. He reports that he does not use drugs.    Review of Systems:   12 systems are reviewed negative except for in HPI.    Meds:     Current Outpatient Medications:     allopurinol (ZYLOPRIM) 100 MG tablet, Take 100 mg by mouth daily, Disp: , Rfl:     aspirin 81 MG EC tablet, Take 81 mg by mouth daily, Disp: , Rfl:     ciclopirox (LOPROX) 0.77 % cream, Apply topically 2 times daily., Disp: , Rfl:     fluocinonide (LIDEX) 0.05 % external cream, Apply topically as needed (Rash, Irritation), Disp: , Rfl:     fluticasone (FLONASE) 50 MCG/ACT nasal spray, Spray 2 sprays into both nostrils at bedtime, Disp: , Rfl:     glipiZIDE (GLUCOTROL XL) 5 MG 24 hr tablet, Take 2 tablets by mouth daily, Disp: , Rfl:     isosorbide mononitrate (IMDUR) 30 MG 24 hr tablet, Take 1 tablet (30 mg) by mouth daily., Disp: 90 tablet, Rfl: 3    lisinopril-hydrochlorothiazide (ZESTORETIC) 10-12.5 MG tablet, Take 1 tablet by mouth 2 times daily, Disp: , Rfl:     metoprolol succinate ER (TOPROL XL) 50 MG 24 hr tablet, Take 50 mg by mouth daily, Disp: , Rfl:     omeprazole (PRILOSEC) 20 MG DR capsule, Take 20  mg by mouth daily., Disp: , Rfl:     OZEMPIC, 0.25 OR 0.5 MG/DOSE, 2 MG/3ML pen, Inject 0.5 mg subcutaneously every 7 days., Disp: , Rfl:     predniSONE (DELTASONE) 5 MG tablet, Take 5 mg by mouth daily, Disp: , Rfl:     simvastatin (ZOCOR) 40 MG tablet, Take 40 mg by mouth at bedtime, Disp: , Rfl:     UNABLE TO FIND, Apply topically 2 times daily.  calcipotriene 0.004%-fluorouracil 4% ointment    Patient sig: Apply to top of head, forehead, cheeks and outer rims of ears. Use twice daily for 5 da, Disp: , Rfl:     nitroGLYcerin (NITROSTAT) 0.4 MG sublingual tablet, For chest pain place 1 tablet under the tongue every 5 minutes for 3 doses. If symptoms persist 5 minutes after 1st dose call 911. (Patient not taking: Reported on 11/20/2024), Disp: 30 tablet, Rfl: 1    ondansetron (ZOFRAN ODT) 4 MG ODT tab, Take 1 tablet (4 mg) by mouth every 6 hours as needed for nausea. (Patient not taking: Reported on 11/20/2024), Disp: 10 tablet, Rfl: 0     Allergies:   Patient has no known allergies.    Objective:      Physical Exam  76.8 kg (169 lb 4.8 oz)     Body mass index is 27.33 kg/m .  /60 (BP Location: Left arm, Patient Position: Sitting, Cuff Size: Adult Regular)   Pulse 82   Resp 20   Wt 76.8 kg (169 lb 4.8 oz)   SpO2 93%   BMI 27.33 kg/m      General Appearance:   Awake, Alert, No acute distress.   HEENT:  Pupil equal, reactive to light. No scleral icterus; the mucous membranes were moist. No oral ulcers or thrush.    Neck: No cervical bruits. No JVD. No thyromegaly. No lymph node enlargement or tenderness.   Chest: The spine was straight. The chest was symmetric.   Lungs:   Respirations unlabored. Lungs are clear to auscultation. No crackles. No wheezing.   Cardiovascular:   RRR, normal first and second heart sounds with no murmurs. No rubs or gallops.    Abdomen:  Obese. Soft. No tenderness. Non-distended. Bowels sounds are present   Extremities: Equal posterior tibial pulses.  No leg edema.   Skin: No  rashes or ulcers. Warm, Dry.   Musculoskeletal: No tenderness. No deformity.   Neurologic: Mood and affect are appropriate. No focal deficits.         EKG:  Personally reivewed  Sinus tachycardia  No ischemic ST-T change    Cardiac Imaging Studies  Nuclear stress test on November 10, 2023:    1.The nuclear stress test is abnormal.    2.Negative pharmacological regadenoson ECG for ischemia.    3.There is a small area of mild ischemia in the distal anterior segment(s) of the left ventricle.  No scar seen.    4.The left ventricular ejection fraction at stress is greater than 70%.    5.Stress to rest cavity ratio is 1.26.    6.The patient is at a low risk of future cardiac ischemic events.    There is no prior study for comparison.    Lab Review   Lab Results   Component Value Date     01/02/2020    CO2 23 01/02/2020    BUN 27 01/02/2020     Lab Results   Component Value Date    WBC 16.5 01/02/2020    HGB 13.1 01/02/2020    HCT 38.2 01/02/2020     01/02/2020     01/02/2020

## 2024-11-20 NOTE — LETTER
11/20/2024    Tomer Toscano MD  [address]  [facility] [address]    RE: Lino Llanos       Dear Colleague,     I had the pleasure of seeing Lino Llanos in the [facility].      Click to link to [facility] HEART CARE NOTE       Assessment/Plan:   Coronary artery disease with stable angina: The patient had no chest pain over last 6 months.  Continue aspirin 81 mg daily, isosorbide mononitrate 30 mg daily, metoprolol succinate 50 mg daily.  Continue to monitor.  Benign essential hypertension: His blood pressure is is low sometimes, controlled metoprolol XL 50 mg daily, reduce lisinopril-hydrochlorothiazide 10-25 mg from a twice a day to once a day.  Continue to monitor his blood pressure.  Dyslipidemia: Continue simvastatin 40 mg at bedtime.  LDL was 63.  Stage IV CKD, type 2 diabetes: Follow-up with primary care physician.  No change in treatment today.    Thank you for the opportunity to be involved in the care of Lino Llanos. If you have any questions, please feel free to contact me.  I will see the patient again in 6 months and as needed    Much or all of the text in this note was generated through the use of Dragon Dictate voice-to-text software. Errors in spelling or words which seem out of context are unintentional.   Sound alike errors, in particular, may have escaped editing.       History of Present Illness:   It is my pleasure to see Lino Llanos at the [facility] for routine cardiology follow-up. Lino Llanos is a 89 year old male with a medical history of coronary artery disease status post stent placement 20 years ago, benign essential hypertension, dyslipidemia, stage IV CKD.    The patient states that his chest pain has been well-controlled with isosorbide mononitrate 30 mg daily.  He did not have episode of chest pain over last 6 months.  He denies shortness of breath on exertion, palpitations,  dizziness, orthopnea, PND.  He has no leg edema.  He started Ozempic for the management of diabetes and overweight.  He lost nearly 30 pounds from beginning of this year.  His blood pressure sometimes is low leading to lightheadedness.  His pulse is in normal range.  LDL was controlled.      Past Medical History:     Patient Active Problem List   Diagnosis     Coronary artery disease involving native coronary artery of native heart with angina pectoris (H)     Benign essential hypertension     Dyslipidemia, goal LDL below 70     Type 2 diabetes mellitus without complication, without long-term current use of insulin (H)     Chronic kidney disease, stage IV (severe) (H)     Esophageal reflux     Allergic rhinitis     Gout     Hearing loss     Obesity       Past Surgical History:     Past Surgical History:   Procedure Laterality Date     APPENDECTOMY         Family History:   Reviewed: Both parents had CAD.  Mother had CABG at age 83.    Social History:    reports that he has quit smoking. His smoking use included cigarettes and pipe. He has never used smokeless tobacco. He reports that he does not use drugs.    Review of Systems:   12 systems are reviewed negative except for in HPI.    Meds:     Current Outpatient Medications:      allopurinol (ZYLOPRIM) 100 MG tablet, Take 100 mg by mouth daily, Disp: , Rfl:      aspirin 81 MG EC tablet, Take 81 mg by mouth daily, Disp: , Rfl:      ciclopirox (LOPROX) 0.77 % cream, Apply topically 2 times daily., Disp: , Rfl:      fluocinonide (LIDEX) 0.05 % external cream, Apply topically as needed (Rash, Irritation), Disp: , Rfl:      fluticasone (FLONASE) 50 MCG/ACT nasal spray, Spray 2 sprays into both nostrils at bedtime, Disp: , Rfl:      glipiZIDE (GLUCOTROL XL) 5 MG 24 hr tablet, Take 2 tablets by mouth daily, Disp: , Rfl:      isosorbide mononitrate (IMDUR) 30 MG 24 hr tablet, Take 1 tablet (30 mg) by mouth daily., Disp: 90 tablet, Rfl: 3     lisinopril-hydrochlorothiazide  (ZESTORETIC) 10-12.5 MG tablet, Take 1 tablet by mouth 2 times daily, Disp: , Rfl:      metoprolol succinate ER (TOPROL XL) 50 MG 24 hr tablet, Take 50 mg by mouth daily, Disp: , Rfl:      omeprazole (PRILOSEC) 20 MG DR capsule, Take 20 mg by mouth daily., Disp: , Rfl:      OZEMPIC, 0.25 OR 0.5 MG/DOSE, 2 MG/3ML pen, Inject 0.5 mg subcutaneously every 7 days., Disp: , Rfl:      predniSONE (DELTASONE) 5 MG tablet, Take 5 mg by mouth daily, Disp: , Rfl:      simvastatin (ZOCOR) 40 MG tablet, Take 40 mg by mouth at bedtime, Disp: , Rfl:      UNABLE TO FIND, Apply topically 2 times daily.  calcipotriene 0.004%-fluorouracil 4% ointment    Patient sig: Apply to top of head, forehead, cheeks and outer rims of ears. Use twice daily for 5 da, Disp: , Rfl:      nitroGLYcerin (NITROSTAT) 0.4 MG sublingual tablet, For chest pain place 1 tablet under the tongue every 5 minutes for 3 doses. If symptoms persist 5 minutes after 1st dose call 911. (Patient not taking: Reported on 11/20/2024), Disp: 30 tablet, Rfl: 1     ondansetron (ZOFRAN ODT) 4 MG ODT tab, Take 1 tablet (4 mg) by mouth every 6 hours as needed for nausea. (Patient not taking: Reported on 11/20/2024), Disp: 10 tablet, Rfl: 0     Allergies:   Patient has no known allergies.    Objective:      Physical Exam  76.8 kg (169 lb 4.8 oz)     Body mass index is 27.33 kg/m .  /60 (BP Location: Left arm, Patient Position: Sitting, Cuff Size: Adult Regular)   Pulse 82   Resp 20   Wt 76.8 kg (169 lb 4.8 oz)   SpO2 93%   BMI 27.33 kg/m      General Appearance:   Awake, Alert, No acute distress.   HEENT:  Pupil equal, reactive to light. No scleral icterus; the mucous membranes were moist. No oral ulcers or thrush.    Neck: No cervical bruits. No JVD. No thyromegaly. No lymph node enlargement or tenderness.   Chest: The spine was straight. The chest was symmetric.   Lungs:   Respirations unlabored. Lungs are clear to auscultation. No crackles. No wheezing.    Cardiovascular:   RRR, normal first and second heart sounds with no murmurs. No rubs or gallops.    Abdomen:  Obese. Soft. No tenderness. Non-distended. Bowels sounds are present   Extremities: Equal posterior tibial pulses.  No leg edema.   Skin: No rashes or ulcers. Warm, Dry.   Musculoskeletal: No tenderness. No deformity.   Neurologic: Mood and affect are appropriate. No focal deficits.         EKG:  Personally reivewed  Sinus tachycardia  No ischemic ST-T change    Cardiac Imaging Studies  Nuclear stress test on November 10, 2023:     1.The nuclear stress test is abnormal.     2.Negative pharmacological regadenoson ECG for ischemia.     3.There is a small area of mild ischemia in the distal anterior segment(s) of the left ventricle.  No scar seen.     4.The left ventricular ejection fraction at stress is greater than 70%.     5.Stress to rest cavity ratio is 1.26.     6.The patient is at a low risk of future cardiac ischemic events.     There is no prior study for comparison.    Lab Review   Lab Results   Component Value Date     01/02/2020    CO2 23 01/02/2020    BUN 27 01/02/2020     Lab Results   Component Value Date    WBC 16.5 01/02/2020    HGB 13.1 01/02/2020    HCT 38.2 01/02/2020     01/02/2020     01/02/2020                 Thank you for allowing me to participate in the care of your patient.      Sincerely,     Antoinette Salazar MD     [facility]  cc:   Antoinette Salazar MD  [address] [facility] [address]  [place] [address]

## 2024-12-08 ENCOUNTER — HEALTH MAINTENANCE LETTER (OUTPATIENT)
Age: 89
End: 2024-12-08

## 2025-01-18 ENCOUNTER — HEALTH MAINTENANCE LETTER (OUTPATIENT)
Age: 89
End: 2025-01-18

## 2025-04-27 ENCOUNTER — HEALTH MAINTENANCE LETTER (OUTPATIENT)
Age: 89
End: 2025-04-27

## 2025-05-31 ENCOUNTER — HOSPITAL ENCOUNTER (EMERGENCY)
Facility: HOSPITAL | Age: 89
Discharge: HOME | End: 2025-05-31
Attending: EMERGENCY MEDICINE | Admitting: EMERGENCY MEDICINE
Payer: COMMERCIAL

## 2025-05-31 VITALS
HEART RATE: 85 BPM | OXYGEN SATURATION: 96 % | BODY MASS INDEX: 25.39 KG/M2 | DIASTOLIC BLOOD PRESSURE: 67 MMHG | SYSTOLIC BLOOD PRESSURE: 115 MMHG | HEIGHT: 66 IN | RESPIRATION RATE: 16 BRPM | WEIGHT: 158 LBS | TEMPERATURE: 98.4 F

## 2025-05-31 DIAGNOSIS — K57.92 DIVERTICULITIS: ICD-10-CM

## 2025-05-31 DIAGNOSIS — R10.32 LLQ ABDOMINAL PAIN: ICD-10-CM

## 2025-05-31 PROCEDURE — 250N000013 HC RX MED GY IP 250 OP 250 PS 637: Performed by: EMERGENCY MEDICINE

## 2025-05-31 PROCEDURE — 99284 EMERGENCY DEPT VISIT MOD MDM: CPT

## 2025-05-31 RX ORDER — METRONIDAZOLE 500 MG/1
500 TABLET ORAL ONCE
Status: COMPLETED | OUTPATIENT
Start: 2025-05-31 | End: 2025-05-31

## 2025-05-31 RX ORDER — CIPROFLOXACIN 500 MG/1
500 TABLET, FILM COATED ORAL DAILY
Qty: 10 TABLET | Refills: 0 | Status: SHIPPED | OUTPATIENT
Start: 2025-05-31 | End: 2025-06-10

## 2025-05-31 RX ORDER — METRONIDAZOLE 500 MG/1
500 TABLET ORAL 2 TIMES DAILY
Qty: 20 TABLET | Refills: 0 | Status: SHIPPED | OUTPATIENT
Start: 2025-05-31 | End: 2025-06-10

## 2025-05-31 RX ORDER — ACETAMINOPHEN 325 MG/1
975 TABLET ORAL ONCE
Status: COMPLETED | OUTPATIENT
Start: 2025-05-31 | End: 2025-05-31

## 2025-05-31 RX ORDER — CIPROFLOXACIN 250 MG/1
500 TABLET, FILM COATED ORAL ONCE
Status: COMPLETED | OUTPATIENT
Start: 2025-05-31 | End: 2025-05-31

## 2025-05-31 RX ADMIN — CIPROFLOXACIN HYDROCHLORIDE 500 MG: 250 TABLET, FILM COATED ORAL at 10:15

## 2025-05-31 RX ADMIN — METRONIDAZOLE 500 MG: 500 TABLET ORAL at 10:15

## 2025-05-31 RX ADMIN — ACETAMINOPHEN 975 MG: 325 TABLET ORAL at 10:15

## 2025-05-31 ASSESSMENT — COLUMBIA-SUICIDE SEVERITY RATING SCALE - C-SSRS
6. HAVE YOU EVER DONE ANYTHING, STARTED TO DO ANYTHING, OR PREPARED TO DO ANYTHING TO END YOUR LIFE?: NO
1. IN THE PAST MONTH, HAVE YOU WISHED YOU WERE DEAD OR WISHED YOU COULD GO TO SLEEP AND NOT WAKE UP?: NO
2. HAVE YOU ACTUALLY HAD ANY THOUGHTS OF KILLING YOURSELF IN THE PAST MONTH?: NO

## 2025-05-31 ASSESSMENT — ACTIVITIES OF DAILY LIVING (ADL): ADLS_ACUITY_SCORE: 41

## 2025-05-31 NOTE — ED PROVIDER NOTES
EMERGENCY DEPARTMENT ENCOUNTER      NAME: Lino Llanos  AGE: 89 year old male  YOB: 1935  MRN: [id number]  EVALUATION DATE & TIME: 2025  9:40 AM    PCP: Tomer Toscano    ED PROVIDER: Rosie Zavala MD    Chief Complaint   Patient presents with    Abdominal Pain         FINAL IMPRESSION:  1. Diverticulitis    2. LLQ abdominal pain          ED COURSE & MEDICAL DECISION MAKING:    Pertinent Labs & Imaging studies reviewed. (See chart for details)  89 year old male with history of HTN, HLD, CAD, CKD, DM who presents to the Emergency Department for evaluation of left lower quadrant abdominal pain since yesterday, reminiscent of previous episodes of diverticulitis.  No associated fevers, chills, flank pain, urinary symptoms, change in bowel habits.  Some mild nausea.  Has not taken anything for pain, reproducible left lower quadrant abdominal pain on examination.  Symptoms seem highly suspicious for recurrent episode of diverticulitis.  Patient is agreeing.  He has never had any perforation, abscess or complicated diverticulitis.  He does not have any systemic symptoms.  Offered lab work, and repeat CT scan of the abdomen pelvis but patient's wishes to forego and lieu of simply treating for presumptive diverticulitis which I think is reasonable.  Historically he has done better with Cipro Flagyl as opposed to Augmentin.  Given first dose of Cipro Flagyl here, prescription for same for home.  Tylenol for pain.  He declines anything stronger.  Warning signs return to ED discussed.      ED Course as of 25 1116   Sat May 31, 2025   0945 I met with the patient to obtain patient history and performed a physical exam. Discussed plan for ED work up including potential diagnostic studies and interventions.        Medical Decision Making  I obtained history from Family Member/Significant Other  I reviewed the EMR: Prior Imagin2024 CT abdomen pelvis  Discharge. I prescribed additional  prescription strength medication(s) as charted. See documentation for any additional details.    MIPS (CTPE, Dental pain, Flowers, Sinusitis, Asthma/COPD, Head Trauma): Not Applicable    SEPSIS: None          At the conclusion of the encounter I discussed the results of all of the tests and the disposition. The questions were answered. The patient or family acknowledged understanding and was agreeable with the care plan.      MEDICATIONS GIVEN IN THE EMERGENCY:  Medications   ciprofloxacin (CIPRO) tablet 500 mg (500 mg Oral $Given 5/31/25 1015)   metroNIDAZOLE (FLAGYL) tablet 500 mg (500 mg Oral $Given 5/31/25 1015)   acetaminophen (TYLENOL) tablet 975 mg (975 mg Oral $Given 5/31/25 1015)       NEW PRESCRIPTIONS STARTED AT TODAY'S ER VISIT  Discharge Medication List as of 5/31/2025 10:18 AM        START taking these medications    Details   ciprofloxacin (CIPRO) 500 MG tablet Take 1 tablet (500 mg) by mouth daily for 10 days., Disp-10 tablet, R-0, E-Prescribe      metroNIDAZOLE (FLAGYL) 500 MG tablet Take 1 tablet (500 mg) by mouth 2 times daily for 10 days., Disp-20 tablet, R-0, E-Prescribe                =================================================================    HPI    Patient information was obtained from: patient and son.     Use of Intrepreter: N/A      Lino Llanos is a 89 year old male with pertinent medical history of hypertension, chronic kidney disease, coronary artery disease, hyperlipidemia, type 2 diabetes, diverticulitis, who presents for abdominal pain.     Patient presents to the ED for concerns of lower abdominal pain that began yesterday morning (5/30/25). He states that his pain is similar to his diverticulitis pain that he had in November. His past diverticulitis was not complicated. He states that his Bms are erratic and sometimes liquid. Urinating decreases his pain. He has not taken any pain medications due to his kidney problems. He was seen here in October and November. He was  able to tolerate the 2 antibiotics for 10 days in November without issue. He is ok with tylenol. He gets his prescriptions at the [facility] on [address] in [place].     Patient denies hematochezia, hematuria, dysuria, and frequency. Patient states no other complaints or concerns at this time.     Per Chart Review:   11/8/24, [facility] ED: Patient was here for apparent diverticulitis. He was treated at home with antibiotics. Sounds like he was very slow to improve and was still having hard stools until the day he finished his medication he actually was improved 2 days prior to presentation. Now right away his pain is back again. If it appears similar we could try a longer course of antibiotics at home since he had done well with that and had tolerated his symptoms well. He does however appear rather dehydrated which may also be reason for admission and his pallor in the waiting room is likely in large part due to dehydration so we are going to start some fluids here. CT abdomen pelvis without contrast: 1.  New focal area of acute diverticulitis within the distal descending colon. Stable focal area of diverticulitis within the proximal sigmoid colon. No evidence for perforation. 2.  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 Renally dosed the Cipro, Flagyl is unchanged.  Send home with Zofran as well.  Return precautions discussed.  1 week follow-up recommended.  Also recommended half dose glipizide for the next week to prevent hypoglycemia.       PAST MEDICAL HISTORY:  No past medical history on file.    PAST SURGICAL HISTORY:  Past Surgical History:   Procedure Laterality Date    APPENDECTOMY         CURRENT MEDICATIONS:    Prior to Admission Medications   Prescriptions Last Dose Informant Patient Reported? Taking?   OZEMPIC, 0.25 OR 0.5 MG/DOSE, 2 MG/3ML pen   Yes No   Sig: Inject 0.5 mg subcutaneously every 7 days.    UNABLE TO FIND   Yes No   Sig: Apply topically 2 times daily.   calcipotriene 0.004%-fluorouracil 4% ointment     Patient sig: Apply to top of head, forehead, cheeks and outer rims of ears. Use twice daily for 5 da   allopurinol (ZYLOPRIM) 100 MG tablet   Yes No   Sig: Take 100 mg by mouth daily   aspirin 81 MG EC tablet   Yes No   Sig: Take 81 mg by mouth daily   ciclopirox (LOPROX) 0.77 % cream   Yes No   Sig: Apply topically 2 times daily.   fluocinonide (LIDEX) 0.05 % external cream   Yes No   Sig: Apply topically as needed (Rash, Irritation)   fluticasone (FLONASE) 50 MCG/ACT nasal spray   Yes No   Sig: Spray 2 sprays into both nostrils at bedtime   glipiZIDE (GLUCOTROL XL) 5 MG 24 hr tablet   Yes No   Sig: Take 2 tablets by mouth daily   isosorbide mononitrate (IMDUR) 30 MG 24 hr tablet   No No   Sig: Take 1 tablet (30 mg) by mouth daily.   lisinopril-hydrochlorothiazide (ZESTORETIC) 10-12.5 MG tablet   Yes No   Sig: Take 1 tablet by mouth 2 times daily   metoprolol succinate ER (TOPROL XL) 50 MG 24 hr tablet   Yes No   Sig: Take 50 mg by mouth daily   nitroGLYcerin (NITROSTAT) 0.4 MG sublingual tablet   No No   Sig: For chest pain place 1 tablet under the tongue every 5 minutes for 3 doses. If symptoms persist 5 minutes after 1st dose call 911.   Patient not taking: Reported on 11/20/2024   omeprazole (PRILOSEC) 20 MG DR capsule   Yes No   Sig: Take 20 mg by mouth daily.   ondansetron (ZOFRAN ODT) 4 MG ODT tab   No No   Sig: Take 1 tablet (4 mg) by mouth every 6 hours as needed for nausea.   Patient not taking: Reported on 11/20/2024   predniSONE (DELTASONE) 5 MG tablet   Yes No   Sig: Take 5 mg by mouth daily   simvastatin (ZOCOR) 40 MG tablet   Yes No   Sig: Take 40 mg by mouth at bedtime      Facility-Administered Medications: None       ALLERGIES:  No Known Allergies    FAMILY HISTORY:  No family history on file.    SOCIAL HISTORY:  Social History     Tobacco Use    Smoking status: Former     Types:  "Cigarettes, Pipe    Smokeless tobacco: Never   Substance Use Topics    Drug use: Never        VITALS:  Patient Vitals for the past 24 hrs:   BP Temp Temp src Pulse Resp SpO2 Height Weight   05/31/25 1019 115/67 -- -- 85 -- 96 % -- --   05/31/25 0936 125/69 98.4  F (36.9  C) Oral 90 16 98 % 1.676 m (5' 6\") 71.7 kg (158 lb)       PHYSICAL EXAM    General Appearance: Well-appearing, well-nourished, no acute distress   Head:  Normocephalic  Cardio:  Regular rate and rhythm  Pulm:  No respiratory distress  Back:  No CVA tenderness, normal ROM  Abdomen:  Soft, non distended,no rebound or guarding. LLQ abdominal tenderness.   Skin:  Skin warm, dry, no rashes  Neuro:  Alert and oriented ×3     RADIOLOGY/LABS:  Reviewed all pertinent imaging. Please see official radiology report. All pertinent labs reviewed and interpreted.           The creation of this record is based on the scribe s observations of the work being performed by Rosie Zavala MD and the provider s statements to them. It was created on her behalf by Jonathan Harrington, a trained medical scribe. This document has been checked and approved by the attending provider.    Rosie Zavala MD  Emergency Medicine  [facility] EMERGENCY DEPARTMENT  [facility] [facility] [id number] [phone]  Dept: [phone]     Rosie Zavala MD  05/31/25 1116    "

## 2025-05-31 NOTE — ED TRIAGE NOTES
Patient c/o LLQ abdominal pain started yesterday with nausea.  Patient has history diverticulitis.      Triage Assessment (Adult)       Row Name 05/31/25 0938          Triage Assessment    Airway WDL WDL        Respiratory WDL    Respiratory WDL WDL        Skin Circulation/Temperature WDL    Skin Circulation/Temperature WDL WDL        Cardiac WDL    Cardiac WDL WDL        Peripheral/Neurovascular WDL    Peripheral Neurovascular WDL WDL        Cognitive/Neuro/Behavioral WDL    Cognitive/Neuro/Behavioral WDL WDL

## 2025-08-10 ENCOUNTER — HEALTH MAINTENANCE LETTER (OUTPATIENT)
Age: 89
End: 2025-08-10